# Patient Record
Sex: FEMALE | Employment: OTHER | URBAN - METROPOLITAN AREA
[De-identification: names, ages, dates, MRNs, and addresses within clinical notes are randomized per-mention and may not be internally consistent; named-entity substitution may affect disease eponyms.]

---

## 2023-10-15 ENCOUNTER — APPOINTMENT (OUTPATIENT)
Dept: RADIOLOGY | Facility: HOSPITAL | Age: 78
DRG: 492 | End: 2023-10-15
Payer: MEDICARE

## 2023-10-15 ENCOUNTER — HOSPITAL ENCOUNTER (INPATIENT)
Facility: HOSPITAL | Age: 78
LOS: 5 days | Discharge: NURSING, REHAB OR LONG-TERM CARE | DRG: 492 | End: 2023-10-20
Attending: INTERNAL MEDICINE | Admitting: NURSE PRACTITIONER
Payer: MEDICARE

## 2023-10-15 DIAGNOSIS — S82.851A TRIMALLEOLAR FRACTURE OF ANKLE, CLOSED, RIGHT, INITIAL ENCOUNTER: Primary | ICD-10-CM

## 2023-10-15 DIAGNOSIS — M46.46 DISCITIS OF LUMBAR REGION: ICD-10-CM

## 2023-10-15 DIAGNOSIS — N18.6 ESRD (END STAGE RENAL DISEASE) ON DIALYSIS (MULTI): ICD-10-CM

## 2023-10-15 DIAGNOSIS — Z99.2 ESRD (END STAGE RENAL DISEASE) ON DIALYSIS (MULTI): ICD-10-CM

## 2023-10-15 DIAGNOSIS — M46.20 OSTEOMYELITIS OF SPINE (MULTI): ICD-10-CM

## 2023-10-15 DIAGNOSIS — I48.11 LONGSTANDING PERSISTENT ATRIAL FIBRILLATION (MULTI): ICD-10-CM

## 2023-10-15 DIAGNOSIS — E11.21 TYPE 2 DIABETES MELLITUS WITH NEPHROPATHY (MULTI): ICD-10-CM

## 2023-10-15 PROBLEM — R53.83 FATIGUE: Status: RESOLVED | Noted: 2021-09-21 | Resolved: 2023-10-15

## 2023-10-15 PROBLEM — I27.20 PULMONARY HTN (MULTI): Status: ACTIVE | Noted: 2023-05-15

## 2023-10-15 PROBLEM — I07.1 TRICUSPID VALVE REGURGITATION: Status: ACTIVE | Noted: 2023-05-15

## 2023-10-15 PROBLEM — R68.89 IMPAIRED FUNCTION OF UPPER EXTREMITY: Status: RESOLVED | Noted: 2020-02-07 | Resolved: 2023-10-15

## 2023-10-15 PROBLEM — I25.10 CAD (CORONARY ARTERY DISEASE): Status: ACTIVE | Noted: 2021-09-22

## 2023-10-15 PROBLEM — R17 JAUNDICE: Status: RESOLVED | Noted: 2018-09-19 | Resolved: 2023-10-15

## 2023-10-15 PROBLEM — I48.91 ATRIAL FIBRILLATION (MULTI): Status: ACTIVE | Noted: 2021-09-21

## 2023-10-15 PROBLEM — R47.1 DYSARTHRIA: Status: RESOLVED | Noted: 2017-01-28 | Resolved: 2023-10-15

## 2023-10-15 PROBLEM — R10.13 EPIGASTRIC PAIN: Status: RESOLVED | Noted: 2018-09-19 | Resolved: 2023-10-15

## 2023-10-15 PROBLEM — S52.502D CLOSED FRACTURE OF DISTAL END OF LEFT RADIUS WITH ROUTINE HEALING: Status: RESOLVED | Noted: 2020-02-07 | Resolved: 2023-10-15

## 2023-10-15 PROBLEM — M81.0 OSTEOPOROSIS: Status: ACTIVE | Noted: 2017-10-11

## 2023-10-15 PROBLEM — R01.1 HEART MURMUR PREVIOUSLY UNDIAGNOSED: Status: ACTIVE | Noted: 2023-08-29

## 2023-10-15 PROBLEM — K68.12 PSOAS ABSCESS, RIGHT (MULTI): Status: ACTIVE | Noted: 2023-08-29

## 2023-10-15 PROBLEM — D69.6 PLATELETS DECREASED (CMS-HCC): Status: RESOLVED | Noted: 2023-09-22 | Resolved: 2023-10-15

## 2023-10-15 LAB
ABO GROUP (TYPE) IN BLOOD: NORMAL
ALBUMIN SERPL BCP-MCNC: 3.6 G/DL (ref 3.4–5)
ALP SERPL-CCNC: 115 U/L (ref 33–136)
ALT SERPL W P-5'-P-CCNC: 19 U/L (ref 7–45)
ANION GAP BLDV CALCULATED.4IONS-SCNC: 0 MMOL/L (ref 10–25)
ANION GAP SERPL CALC-SCNC: 18 MMOL/L (ref 10–20)
ANION GAP SERPL CALC-SCNC: 22 MMOL/L (ref 10–20)
ANTIBODY SCREEN: NORMAL
APTT PPP: 44 SECONDS (ref 27–38)
AST SERPL W P-5'-P-CCNC: 66 U/L (ref 9–39)
BASE EXCESS BLDV CALC-SCNC: 12.2 MMOL/L (ref -2–3)
BASOPHILS # BLD AUTO: 0.02 X10*3/UL (ref 0–0.1)
BASOPHILS NFR BLD AUTO: 0.5 %
BILIRUB SERPL-MCNC: 0.8 MG/DL (ref 0–1.2)
BODY TEMPERATURE: 37 DEGREES CELSIUS
BUN SERPL-MCNC: 20 MG/DL (ref 6–23)
BUN SERPL-MCNC: 20 MG/DL (ref 6–23)
CA-I BLDV-SCNC: 1.06 MMOL/L (ref 1.1–1.33)
CALCIUM SERPL-MCNC: 8.8 MG/DL (ref 8.6–10.6)
CALCIUM SERPL-MCNC: 9 MG/DL (ref 8.6–10.6)
CHLORIDE BLDV-SCNC: 100 MMOL/L (ref 98–107)
CHLORIDE SERPL-SCNC: 96 MMOL/L (ref 98–107)
CHLORIDE SERPL-SCNC: 97 MMOL/L (ref 98–107)
CO2 SERPL-SCNC: 23 MMOL/L (ref 21–32)
CO2 SERPL-SCNC: 27 MMOL/L (ref 21–32)
CREAT SERPL-MCNC: 4.17 MG/DL (ref 0.5–1.05)
CREAT SERPL-MCNC: 4.24 MG/DL (ref 0.5–1.05)
EOSINOPHIL # BLD AUTO: 0.06 X10*3/UL (ref 0–0.4)
EOSINOPHIL NFR BLD AUTO: 1.6 %
ERYTHROCYTE [DISTWIDTH] IN BLOOD BY AUTOMATED COUNT: 14 % (ref 11.5–14.5)
ERYTHROCYTE [DISTWIDTH] IN BLOOD BY AUTOMATED COUNT: 14.5 % (ref 11.5–14.5)
GFR SERPL CREATININE-BSD FRML MDRD: 10 ML/MIN/1.73M*2
GFR SERPL CREATININE-BSD FRML MDRD: 10 ML/MIN/1.73M*2
GLUCOSE BLD MANUAL STRIP-MCNC: 143 MG/DL (ref 74–99)
GLUCOSE BLDV-MCNC: 122 MG/DL (ref 74–99)
GLUCOSE SERPL-MCNC: 112 MG/DL (ref 74–99)
GLUCOSE SERPL-MCNC: 112 MG/DL (ref 74–99)
HCO3 BLDV-SCNC: 37.2 MMOL/L (ref 22–26)
HCT VFR BLD AUTO: 23.3 % (ref 36–46)
HCT VFR BLD AUTO: 24.9 % (ref 36–46)
HCT VFR BLD EST: 28 % (ref 36–46)
HGB BLD-MCNC: 7.9 G/DL (ref 12–16)
HGB BLD-MCNC: 8.6 G/DL (ref 12–16)
HGB BLDV-MCNC: 9.3 G/DL (ref 12–16)
IMM GRANULOCYTES # BLD AUTO: 0.02 X10*3/UL (ref 0–0.5)
IMM GRANULOCYTES NFR BLD AUTO: 0.5 % (ref 0–0.9)
INR PPP: 1.3 (ref 0.9–1.1)
LACTATE BLDV-SCNC: 2.8 MMOL/L (ref 0.4–2)
LYMPHOCYTES # BLD AUTO: 0.54 X10*3/UL (ref 0.8–3)
LYMPHOCYTES NFR BLD AUTO: 14.6 %
MCH RBC QN AUTO: 34.8 PG (ref 26–34)
MCH RBC QN AUTO: 35.2 PG (ref 26–34)
MCHC RBC AUTO-ENTMCNC: 33.9 G/DL (ref 32–36)
MCHC RBC AUTO-ENTMCNC: 34.5 G/DL (ref 32–36)
MCV RBC AUTO: 102 FL (ref 80–100)
MCV RBC AUTO: 103 FL (ref 80–100)
MONOCYTES # BLD AUTO: 0.31 X10*3/UL (ref 0.05–0.8)
MONOCYTES NFR BLD AUTO: 8.4 %
NEUTROPHILS # BLD AUTO: 2.76 X10*3/UL (ref 1.6–5.5)
NEUTROPHILS NFR BLD AUTO: 74.4 %
NRBC BLD-RTO: 0 /100 WBCS (ref 0–0)
NRBC BLD-RTO: 0 /100 WBCS (ref 0–0)
OXYHGB MFR BLDV: 62.1 % (ref 45–75)
PCO2 BLDV: 50 MM HG (ref 41–51)
PH BLDV: 7.48 PH (ref 7.33–7.43)
PLATELET # BLD AUTO: 81 X10*3/UL (ref 150–450)
PLATELET # BLD AUTO: 86 X10*3/UL (ref 150–450)
PMV BLD AUTO: 10.4 FL (ref 7.5–11.5)
PMV BLD AUTO: 10.5 FL (ref 7.5–11.5)
PO2 BLDV: 46 MM HG (ref 35–45)
POTASSIUM BLDV-SCNC: 5.2 MMOL/L (ref 3.5–5.3)
POTASSIUM SERPL-SCNC: 4.7 MMOL/L (ref 3.5–5.3)
POTASSIUM SERPL-SCNC: 4.7 MMOL/L (ref 3.5–5.3)
PROT SERPL-MCNC: 6.2 G/DL (ref 6.4–8.2)
PROTHROMBIN TIME: 14.2 SECONDS (ref 9.8–12.8)
RBC # BLD AUTO: 2.27 X10*6/UL (ref 4–5.2)
RBC # BLD AUTO: 2.44 X10*6/UL (ref 4–5.2)
RH FACTOR (ANTIGEN D): NORMAL
SAO2 % BLDV: 65 % (ref 45–75)
SARS-COV-2 RNA RESP QL NAA+PROBE: NOT DETECTED
SODIUM BLDV-SCNC: 132 MMOL/L (ref 136–145)
SODIUM SERPL-SCNC: 136 MMOL/L (ref 136–145)
SODIUM SERPL-SCNC: 137 MMOL/L (ref 136–145)
WBC # BLD AUTO: 3.7 X10*3/UL (ref 4.4–11.3)
WBC # BLD AUTO: 3.9 X10*3/UL (ref 4.4–11.3)

## 2023-10-15 PROCEDURE — 36415 COLL VENOUS BLD VENIPUNCTURE: CPT | Mod: CMCLAB | Performed by: EMERGENCY MEDICINE

## 2023-10-15 PROCEDURE — 36415 COLL VENOUS BLD VENIPUNCTURE: CPT | Mod: CMCLAB

## 2023-10-15 PROCEDURE — 73630 X-RAY EXAM OF FOOT: CPT | Mod: RT,FY

## 2023-10-15 PROCEDURE — 82947 ASSAY GLUCOSE BLOOD QUANT: CPT

## 2023-10-15 PROCEDURE — 0SSFXZZ REPOSITION RIGHT ANKLE JOINT, EXTERNAL APPROACH: ICD-10-PCS

## 2023-10-15 PROCEDURE — 85025 COMPLETE CBC W/AUTO DIFF WBC: CPT | Performed by: EMERGENCY MEDICINE

## 2023-10-15 PROCEDURE — 2500000004 HC RX 250 GENERAL PHARMACY W/ HCPCS (ALT 636 FOR OP/ED): Performed by: EMERGENCY MEDICINE

## 2023-10-15 PROCEDURE — 82805 BLOOD GASES W/O2 SATURATION: CPT | Mod: CMCLAB

## 2023-10-15 PROCEDURE — 99222 1ST HOSP IP/OBS MODERATE 55: CPT

## 2023-10-15 PROCEDURE — 87635 SARS-COV-2 COVID-19 AMP PRB: CPT | Mod: CMCLAB | Performed by: NURSE PRACTITIONER

## 2023-10-15 PROCEDURE — 99223 1ST HOSP IP/OBS HIGH 75: CPT | Performed by: NURSE PRACTITIONER

## 2023-10-15 PROCEDURE — 73564 X-RAY EXAM KNEE 4 OR MORE: CPT | Mod: RT,FY

## 2023-10-15 PROCEDURE — 85730 THROMBOPLASTIN TIME PARTIAL: CPT | Performed by: EMERGENCY MEDICINE

## 2023-10-15 PROCEDURE — 80053 COMPREHEN METABOLIC PANEL: CPT | Performed by: EMERGENCY MEDICINE

## 2023-10-15 PROCEDURE — 73700 CT LOWER EXTREMITY W/O DYE: CPT | Mod: RT,FR,MG

## 2023-10-15 PROCEDURE — 73610 X-RAY EXAM OF ANKLE: CPT | Mod: RT,FY

## 2023-10-15 PROCEDURE — 73502 X-RAY EXAM HIP UNI 2-3 VIEWS: CPT | Mod: RT,FR

## 2023-10-15 PROCEDURE — 73630 X-RAY EXAM OF FOOT: CPT | Mod: RIGHT SIDE | Performed by: RADIOLOGY

## 2023-10-15 PROCEDURE — 73590 X-RAY EXAM OF LOWER LEG: CPT | Mod: RIGHT SIDE | Performed by: RADIOLOGY

## 2023-10-15 PROCEDURE — 85027 COMPLETE CBC AUTOMATED: CPT | Mod: CMCLAB

## 2023-10-15 PROCEDURE — 73610 X-RAY EXAM OF ANKLE: CPT | Mod: RT,FY,FR

## 2023-10-15 PROCEDURE — 1100000001 HC PRIVATE ROOM DAILY

## 2023-10-15 PROCEDURE — 73610 X-RAY EXAM OF ANKLE: CPT | Mod: RIGHT SIDE | Performed by: RADIOLOGY

## 2023-10-15 PROCEDURE — 73700 CT LOWER EXTREMITY W/O DYE: CPT | Mod: RIGHT SIDE | Performed by: RADIOLOGY

## 2023-10-15 PROCEDURE — 86850 RBC ANTIBODY SCREEN: CPT

## 2023-10-15 PROCEDURE — 71045 X-RAY EXAM CHEST 1 VIEW: CPT | Mod: FY

## 2023-10-15 PROCEDURE — 96374 THER/PROPH/DIAG INJ IV PUSH: CPT

## 2023-10-15 PROCEDURE — 96375 TX/PRO/DX INJ NEW DRUG ADDON: CPT

## 2023-10-15 PROCEDURE — 85610 PROTHROMBIN TIME: CPT | Mod: CMCLAB | Performed by: EMERGENCY MEDICINE

## 2023-10-15 PROCEDURE — 99285 EMERGENCY DEPT VISIT HI MDM: CPT | Mod: 25 | Performed by: EMERGENCY MEDICINE

## 2023-10-15 PROCEDURE — 73552 X-RAY EXAM OF FEMUR 2/>: CPT | Mod: RT,FY,FR

## 2023-10-15 PROCEDURE — 71045 X-RAY EXAM CHEST 1 VIEW: CPT | Mod: FOREIGN READ | Performed by: RADIOLOGY

## 2023-10-15 PROCEDURE — 76000 FLUOROSCOPY <1 HR PHYS/QHP: CPT

## 2023-10-15 PROCEDURE — 73564 X-RAY EXAM KNEE 4 OR MORE: CPT | Mod: RIGHT SIDE | Performed by: STUDENT IN AN ORGANIZED HEALTH CARE EDUCATION/TRAINING PROGRAM

## 2023-10-15 PROCEDURE — 80048 BASIC METABOLIC PNL TOTAL CA: CPT | Mod: CMCLAB | Performed by: INTERNAL MEDICINE

## 2023-10-15 PROCEDURE — 99285 EMERGENCY DEPT VISIT HI MDM: CPT | Performed by: EMERGENCY MEDICINE

## 2023-10-15 PROCEDURE — 73502 X-RAY EXAM HIP UNI 2-3 VIEWS: CPT | Mod: RIGHT SIDE | Performed by: RADIOLOGY

## 2023-10-15 PROCEDURE — 86920 COMPATIBILITY TEST SPIN: CPT

## 2023-10-15 PROCEDURE — 73590 X-RAY EXAM OF LOWER LEG: CPT | Mod: RT,FY,FR

## 2023-10-15 PROCEDURE — 73552 X-RAY EXAM OF FEMUR 2/>: CPT | Mod: RIGHT SIDE | Performed by: RADIOLOGY

## 2023-10-15 PROCEDURE — 82947 ASSAY GLUCOSE BLOOD QUANT: CPT | Mod: CMCLAB

## 2023-10-15 PROCEDURE — 2500000001 HC RX 250 WO HCPCS SELF ADMINISTERED DRUGS (ALT 637 FOR MEDICARE OP): Performed by: NURSE PRACTITIONER

## 2023-10-15 RX ORDER — DEXTROSE 50 % IN WATER (D50W) INTRAVENOUS SYRINGE
25
Status: DISCONTINUED | OUTPATIENT
Start: 2023-10-15 | End: 2023-10-20 | Stop reason: HOSPADM

## 2023-10-15 RX ORDER — DICLOFENAC EPOLAMINE 0.01 G/1
1 SYSTEM TOPICAL EVERY 12 HOURS PRN
Status: ON HOLD | COMMUNITY
Start: 2023-09-05 | End: 2023-10-17 | Stop reason: ALTCHOICE

## 2023-10-15 RX ORDER — DEXTROSE MONOHYDRATE 100 MG/ML
0.3 INJECTION, SOLUTION INTRAVENOUS ONCE AS NEEDED
Status: DISCONTINUED | OUTPATIENT
Start: 2023-10-15 | End: 2023-10-20 | Stop reason: HOSPADM

## 2023-10-15 RX ORDER — OMEPRAZOLE 40 MG/1
40 CAPSULE, DELAYED RELEASE ORAL
Status: ON HOLD | COMMUNITY
Start: 2023-04-18 | End: 2023-10-17 | Stop reason: ALTCHOICE

## 2023-10-15 RX ORDER — DICLOFENAC SODIUM 10 MG/G
4 GEL TOPICAL 2 TIMES DAILY
Status: DISCONTINUED | OUTPATIENT
Start: 2023-10-15 | End: 2023-10-20 | Stop reason: HOSPADM

## 2023-10-15 RX ORDER — MIDODRINE HYDROCHLORIDE 5 MG/1
5 TABLET ORAL 3 TIMES WEEKLY
COMMUNITY
Start: 2023-10-12 | End: 2024-01-10

## 2023-10-15 RX ORDER — INSULIN ASPART 100 [IU]/ML
8 INJECTION, SUSPENSION SUBCUTANEOUS EVERY EVENING
Status: ON HOLD | COMMUNITY
End: 2023-10-20 | Stop reason: SDUPTHER

## 2023-10-15 RX ORDER — NYSTATIN 100000 [USP'U]/G
1 POWDER TOPICAL 3 TIMES DAILY
Status: DISCONTINUED | OUTPATIENT
Start: 2023-10-15 | End: 2023-10-20 | Stop reason: HOSPADM

## 2023-10-15 RX ORDER — ACETAMINOPHEN 325 MG/1
975 TABLET ORAL EVERY 8 HOURS
Status: DISCONTINUED | OUTPATIENT
Start: 2023-10-15 | End: 2023-10-20 | Stop reason: HOSPADM

## 2023-10-15 RX ORDER — LIDOCAINE 50 MG/G
1 PATCH TOPICAL
COMMUNITY
Start: 2023-09-15

## 2023-10-15 RX ORDER — RENO CAPS 100; 1.5; 1.7; 20; 10; 1; 150; 5; 6 MG/1; MG/1; MG/1; MG/1; MG/1; MG/1; UG/1; MG/1; UG/1
1 CAPSULE ORAL
COMMUNITY
Start: 2023-02-10 | End: 2023-10-20 | Stop reason: HOSPADM

## 2023-10-15 RX ORDER — LIDOCAINE HYDROCHLORIDE AND EPINEPHRINE 10; 10 MG/ML; UG/ML
20 INJECTION, SOLUTION INFILTRATION; PERINEURAL ONCE
Status: DISCONTINUED | OUTPATIENT
Start: 2023-10-15 | End: 2023-10-20 | Stop reason: HOSPADM

## 2023-10-15 RX ORDER — DICLOFENAC EPOLAMINE 0.01 G/1
1 SYSTEM TOPICAL 2 TIMES DAILY
Status: DISCONTINUED | OUTPATIENT
Start: 2023-10-15 | End: 2023-10-15

## 2023-10-15 RX ORDER — LIDOCAINE 50 MG/G
1 PATCH TOPICAL
Status: DISCONTINUED | OUTPATIENT
Start: 2023-10-15 | End: 2023-10-15

## 2023-10-15 RX ORDER — HYDROMORPHONE HYDROCHLORIDE 1 MG/ML
0.5 INJECTION, SOLUTION INTRAMUSCULAR; INTRAVENOUS; SUBCUTANEOUS EVERY 6 HOURS PRN
Status: DISCONTINUED | OUTPATIENT
Start: 2023-10-15 | End: 2023-10-20 | Stop reason: HOSPADM

## 2023-10-15 RX ORDER — ONDANSETRON HYDROCHLORIDE 2 MG/ML
4 INJECTION, SOLUTION INTRAVENOUS ONCE
Status: COMPLETED | OUTPATIENT
Start: 2023-10-15 | End: 2023-10-15

## 2023-10-15 RX ORDER — ONDANSETRON HYDROCHLORIDE 2 MG/ML
4 INJECTION, SOLUTION INTRAVENOUS EVERY 6 HOURS PRN
Status: DISCONTINUED | OUTPATIENT
Start: 2023-10-15 | End: 2023-10-20 | Stop reason: HOSPADM

## 2023-10-15 RX ORDER — GABAPENTIN 100 MG/1
100 CAPSULE ORAL EVERY MORNING
Status: DISCONTINUED | OUTPATIENT
Start: 2023-10-16 | End: 2023-10-20 | Stop reason: HOSPADM

## 2023-10-15 RX ORDER — GABAPENTIN 100 MG/1
100 CAPSULE ORAL EVERY MORNING
COMMUNITY
End: 2023-10-20 | Stop reason: HOSPADM

## 2023-10-15 RX ORDER — INSULIN ASPART 100 [IU]/ML
10 INJECTION, SUSPENSION SUBCUTANEOUS EVERY MORNING
Status: ON HOLD | COMMUNITY
End: 2023-10-20 | Stop reason: SDUPTHER

## 2023-10-15 RX ORDER — HYDROMORPHONE HYDROCHLORIDE 1 MG/ML
0.5 INJECTION, SOLUTION INTRAMUSCULAR; INTRAVENOUS; SUBCUTANEOUS EVERY 6 HOURS PRN
Status: DISCONTINUED | OUTPATIENT
Start: 2023-10-15 | End: 2023-10-15

## 2023-10-15 RX ORDER — GABAPENTIN 100 MG/1
200 CAPSULE ORAL NIGHTLY
COMMUNITY
Start: 2023-09-15 | End: 2024-01-26

## 2023-10-15 RX ORDER — INSULIN LISPRO 100 [IU]/ML
0-5 INJECTION, SOLUTION INTRAVENOUS; SUBCUTANEOUS
Status: DISCONTINUED | OUTPATIENT
Start: 2023-10-15 | End: 2023-10-20 | Stop reason: HOSPADM

## 2023-10-15 RX ORDER — MECLIZINE HYDROCHLORIDE 25 MG/1
12.5 TABLET ORAL 3 TIMES DAILY PRN
COMMUNITY
Start: 2016-11-30

## 2023-10-15 RX ORDER — AMOXICILLIN 250 MG
2 CAPSULE ORAL 2 TIMES DAILY
Status: DISCONTINUED | OUTPATIENT
Start: 2023-10-15 | End: 2023-10-20 | Stop reason: HOSPADM

## 2023-10-15 RX ORDER — MIDODRINE HYDROCHLORIDE 5 MG/1
5 TABLET ORAL 3 TIMES WEEKLY
Status: DISCONTINUED | OUTPATIENT
Start: 2023-10-16 | End: 2023-10-15

## 2023-10-15 RX ORDER — HYDROMORPHONE HYDROCHLORIDE 1 MG/ML
0.4 INJECTION, SOLUTION INTRAMUSCULAR; INTRAVENOUS; SUBCUTANEOUS ONCE
Status: COMPLETED | OUTPATIENT
Start: 2023-10-15 | End: 2023-10-15

## 2023-10-15 RX ORDER — LIDOCAINE 560 MG/1
1 PATCH PERCUTANEOUS; TOPICAL; TRANSDERMAL DAILY
Status: DISCONTINUED | OUTPATIENT
Start: 2023-10-16 | End: 2023-10-20 | Stop reason: HOSPADM

## 2023-10-15 RX ORDER — OXYCODONE HYDROCHLORIDE 5 MG/1
5 TABLET ORAL EVERY 4 HOURS PRN
Status: DISCONTINUED | OUTPATIENT
Start: 2023-10-15 | End: 2023-10-20 | Stop reason: HOSPADM

## 2023-10-15 RX ORDER — MIDODRINE HYDROCHLORIDE 5 MG/1
5 TABLET ORAL
Status: DISCONTINUED | OUTPATIENT
Start: 2023-10-17 | End: 2023-10-20 | Stop reason: HOSPADM

## 2023-10-15 RX ORDER — METHOCARBAMOL 500 MG/1
500 TABLET, FILM COATED ORAL EVERY 8 HOURS PRN
COMMUNITY
Start: 2023-09-05 | End: 2023-10-20 | Stop reason: HOSPADM

## 2023-10-15 RX ORDER — GABAPENTIN 100 MG/1
200 CAPSULE ORAL NIGHTLY
Status: DISCONTINUED | OUTPATIENT
Start: 2023-10-15 | End: 2023-10-20 | Stop reason: HOSPADM

## 2023-10-15 RX ADMIN — ONDANSETRON 4 MG: 2 INJECTION INTRAMUSCULAR; INTRAVENOUS at 09:37

## 2023-10-15 RX ADMIN — HYDROMORPHONE HYDROCHLORIDE 0.4 MG: 1 INJECTION, SOLUTION INTRAMUSCULAR; INTRAVENOUS; SUBCUTANEOUS at 09:38

## 2023-10-15 RX ADMIN — OXYCODONE HYDROCHLORIDE 5 MG: 5 TABLET ORAL at 18:45

## 2023-10-15 RX ADMIN — ACETAMINOPHEN 975 MG: 325 TABLET ORAL at 18:45

## 2023-10-15 RX ADMIN — GABAPENTIN 200 MG: 100 CAPSULE ORAL at 22:46

## 2023-10-15 RX ADMIN — OXYCODONE HYDROCHLORIDE 5 MG: 5 TABLET ORAL at 22:46

## 2023-10-15 RX ADMIN — SENNOSIDES AND DOCUSATE SODIUM 2 TABLET: 50; 8.6 TABLET ORAL at 22:46

## 2023-10-15 RX ADMIN — NYSTATIN 1 APPLICATION: 100000 POWDER TOPICAL at 21:00

## 2023-10-15 RX ADMIN — ASCORBIC ACID, THIAMINE MONONITRATE,RIBOFLAVIN, NIACINAMIDE, PYRIDOXINE HYDROCHLORIDE, FOLIC ACID, CYANOCOBALAMIN, BIOTIN, CALCIUM PANTOTHENATE, 1 CAPSULE: 100; 1.5; 1.7; 20; 10; 1; 6000; 150000; 5 CAPSULE, LIQUID FILLED ORAL at 18:45

## 2023-10-15 SDOH — SOCIAL STABILITY: SOCIAL INSECURITY: HAS ANYONE EVER THREATENED TO HURT YOUR FAMILY OR YOUR PETS?: NO

## 2023-10-15 SDOH — SOCIAL STABILITY: SOCIAL INSECURITY: ABUSE: ADULT

## 2023-10-15 SDOH — SOCIAL STABILITY: SOCIAL INSECURITY: DO YOU FEEL ANYONE HAS EXPLOITED OR TAKEN ADVANTAGE OF YOU FINANCIALLY OR OF YOUR PERSONAL PROPERTY?: NO

## 2023-10-15 SDOH — SOCIAL STABILITY: SOCIAL INSECURITY: ARE YOU OR HAVE YOU BEEN THREATENED OR ABUSED PHYSICALLY, EMOTIONALLY, OR SEXUALLY BY ANYONE?: NO

## 2023-10-15 SDOH — SOCIAL STABILITY: SOCIAL INSECURITY: HAVE YOU HAD THOUGHTS OF HARMING ANYONE ELSE?: NO

## 2023-10-15 SDOH — SOCIAL STABILITY: SOCIAL INSECURITY: DOES ANYONE TRY TO KEEP YOU FROM HAVING/CONTACTING OTHER FRIENDS OR DOING THINGS OUTSIDE YOUR HOME?: NO

## 2023-10-15 SDOH — SOCIAL STABILITY: SOCIAL INSECURITY: DO YOU FEEL UNSAFE GOING BACK TO THE PLACE WHERE YOU ARE LIVING?: NO

## 2023-10-15 SDOH — SOCIAL STABILITY: SOCIAL INSECURITY: ARE THERE ANY APPARENT SIGNS OF INJURIES/BEHAVIORS THAT COULD BE RELATED TO ABUSE/NEGLECT?: NO

## 2023-10-15 SDOH — SOCIAL STABILITY: SOCIAL INSECURITY: WERE YOU ABLE TO COMPLETE ALL THE BEHAVIORAL HEALTH SCREENINGS?: YES

## 2023-10-15 ASSESSMENT — COGNITIVE AND FUNCTIONAL STATUS - GENERAL
DAILY ACTIVITIY SCORE: 14
DRESSING REGULAR LOWER BODY CLOTHING: A LOT
WALKING IN HOSPITAL ROOM: TOTAL
DRESSING REGULAR UPPER BODY CLOTHING: A LOT
TURNING FROM BACK TO SIDE WHILE IN FLAT BAD: A LOT
TOILETING: A LOT
CLIMB 3 TO 5 STEPS WITH RAILING: TOTAL
MOBILITY SCORE: 8
EATING MEALS: A LITTLE
STANDING UP FROM CHAIR USING ARMS: TOTAL
PERSONAL GROOMING: A LOT
TURNING FROM BACK TO SIDE WHILE IN FLAT BAD: A LITTLE
DRESSING REGULAR LOWER BODY CLOTHING: A LOT
STANDING UP FROM CHAIR USING ARMS: TOTAL
MOVING FROM LYING ON BACK TO SITTING ON SIDE OF FLAT BED WITH BEDRAILS: A LITTLE
WALKING IN HOSPITAL ROOM: TOTAL
TOILETING: A LOT
DRESSING REGULAR UPPER BODY CLOTHING: A LOT
EATING MEALS: A LITTLE
PERSONAL GROOMING: A LITTLE
CLIMB 3 TO 5 STEPS WITH RAILING: TOTAL
MOVING TO AND FROM BED TO CHAIR: TOTAL
MOVING FROM LYING ON BACK TO SITTING ON SIDE OF FLAT BED WITH BEDRAILS: A LOT
HELP NEEDED FOR BATHING: A LOT
HELP NEEDED FOR BATHING: A LOT
MOVING TO AND FROM BED TO CHAIR: TOTAL
MOBILITY SCORE: 10
DAILY ACTIVITIY SCORE: 13

## 2023-10-15 ASSESSMENT — ENCOUNTER SYMPTOMS
CARDIOVASCULAR NEGATIVE: 1
BACK PAIN: 1
NUMBNESS: 1
NAUSEA: 0
EYES NEGATIVE: 1
ARTHRALGIAS: 1
WHEEZING: 0
CONSTITUTIONAL NEGATIVE: 1
CHILLS: 0
NECK PAIN: 0
GASTROINTESTINAL NEGATIVE: 1
JOINT SWELLING: 1
DIZZINESS: 0
RESPIRATORY NEGATIVE: 1
VOMITING: 0
SHORTNESS OF BREATH: 0
LIGHT-HEADEDNESS: 0
WEAKNESS: 1
FEVER: 0
NECK STIFFNESS: 0

## 2023-10-15 ASSESSMENT — PAIN - FUNCTIONAL ASSESSMENT
PAIN_FUNCTIONAL_ASSESSMENT: 0-10

## 2023-10-15 ASSESSMENT — COLUMBIA-SUICIDE SEVERITY RATING SCALE - C-SSRS
6. HAVE YOU EVER DONE ANYTHING, STARTED TO DO ANYTHING, OR PREPARED TO DO ANYTHING TO END YOUR LIFE?: NO
1. IN THE PAST MONTH, HAVE YOU WISHED YOU WERE DEAD OR WISHED YOU COULD GO TO SLEEP AND NOT WAKE UP?: NO
2. HAVE YOU ACTUALLY HAD ANY THOUGHTS OF KILLING YOURSELF?: NO

## 2023-10-15 ASSESSMENT — PATIENT HEALTH QUESTIONNAIRE - PHQ9
1. LITTLE INTEREST OR PLEASURE IN DOING THINGS: NOT AT ALL
2. FEELING DOWN, DEPRESSED OR HOPELESS: NOT AT ALL
SUM OF ALL RESPONSES TO PHQ9 QUESTIONS 1 & 2: 0

## 2023-10-15 ASSESSMENT — ACTIVITIES OF DAILY LIVING (ADL)
ADEQUATE_TO_COMPLETE_ADL: NO
PATIENT'S MEMORY ADEQUATE TO SAFELY COMPLETE DAILY ACTIVITIES?: YES
HEARING - RIGHT EAR: DIFFICULTY WITH NOISE
WALKS IN HOME: NEEDS ASSISTANCE
DRESSING YOURSELF: NEEDS ASSISTANCE
GROOMING: NEEDS ASSISTANCE
ASSISTIVE_DEVICE: EYEGLASSES
JUDGMENT_ADEQUATE_SAFELY_COMPLETE_DAILY_ACTIVITIES: YES
BATHING: NEEDS ASSISTANCE
LACK_OF_TRANSPORTATION: NO
FEEDING YOURSELF: DEPENDENT
HEARING - LEFT EAR: DIFFICULTY WITH NOISE
TOILETING: NEEDS ASSISTANCE

## 2023-10-15 ASSESSMENT — LIFESTYLE VARIABLES
SKIP TO QUESTIONS 9-10: 1
AUDIT-C TOTAL SCORE: 0
HOW OFTEN DO YOU HAVE 6 OR MORE DRINKS ON ONE OCCASION: NEVER
SUBSTANCE_ABUSE_PAST_12_MONTHS: NO
HOW OFTEN DO YOU HAVE A DRINK CONTAINING ALCOHOL: NEVER
AUDIT-C TOTAL SCORE: 0
HOW MANY STANDARD DRINKS CONTAINING ALCOHOL DO YOU HAVE ON A TYPICAL DAY: PATIENT DOES NOT DRINK
PRESCIPTION_ABUSE_PAST_12_MONTHS: NO

## 2023-10-15 ASSESSMENT — PAIN SCALES - GENERAL
PAINLEVEL_OUTOF10: 8
PAINLEVEL_OUTOF10: 7
PAINLEVEL_OUTOF10: 4
PAINLEVEL_OUTOF10: 6

## 2023-10-15 NOTE — H&P
History Of Present Illness  Ms. Lidia Krishna is a 78-year-old female with PMHx: T2DM, HTN, cirrhosis with George, esophageal varices, A-fib on Eliquis, ESRD, osteomyelitis of the back, anemia of chronic disease, thrombocytopenia.  Presented to the ED today after falling at home states she was trying to get into bed and she was too weak and fell on the ground tried to correct herself and her legs hit the chair denies any head injury or loss of consciousness.  Patient just completed antibiotic therapy for osteomyelitis of her back on her last dialysis day.  Patient states since she was diagnosed with the osteomyelitis she has been feeling weak and having difficulty ambulating and getting around.    While in the ED her hemoglobin was found to be 7.9 she does have a history of chronic anemia.  Her platelets were 81 she also has a history of thrombocytopenia.  Mainly her x-rays were negative but she was found to have a right trimalleolar fracture Ortho saw her down in the emergency room and plan to take her to surgery in the morning.  Patient to be admitted regular admit for right trimalleolar fracture.     Past Medical History  Past Medical History:   Diagnosis Date    A-fib (CMS/HCC)     Diabetes mellitus (CMS/HCC)     Esophageal varices (CMS/HCC)     ESRD (end stage renal disease) (CMS/HCC)     Fatigue 09/21/2021    Hypertension     Impaired function of upper extremity 02/07/2020    Jaundice 09/19/2018    Liver cirrhosis secondary to GEORGE (CMS/HCC)     Lower urinary tract infectious disease 10/06/2014    Metabolic acidosis 04/20/2015    Osteomyelitis (CMS/HCC)     Osteoporosis     Platelets decreased (CMS/HCC) 09/22/2023       Surgical History  Past Surgical History:   Procedure Laterality Date    ABDOMINAL SURGERY      APPENDECTOMY      AV FISTULA PLACEMENT      BREAST SURGERY      CARPAL TUNNEL RELEASE      CHOLECYSTECTOMY      EYE SURGERY      MASTOID SURGERY      WRIST SURGERY          Social History  She reports that  she has quit smoking. Her smoking use included cigarettes. She has a 30.00 pack-year smoking history. She has never used smokeless tobacco. She reports that she does not drink alcohol and does not use drugs.    Family History  Family History   Problem Relation Name Age of Onset    Coronary artery disease Mother      Other (pacemaker) Mother      Stroke Father      Hypertension Father      Glaucoma Father      Alcohol abuse Father      Cirrhosis Sister      CARMEN disease Sister      COPD Brother      Coronary artery disease Brother      Stomach cancer Maternal Grandmother          Allergies  Darbepoetin jose-albumin and Povidone-iodine    Review of Systems   Constitutional: Negative.    HENT: Negative.     Eyes: Negative.    Respiratory: Negative.  Negative for wheezing.    Cardiovascular: Negative.    Gastrointestinal: Negative.    Genitourinary: Negative.    Musculoskeletal:  Positive for arthralgias, back pain, gait problem and joint swelling.   Skin: Negative.    Neurological:  Positive for weakness.        Physical Exam  Constitutional:       Appearance: Normal appearance. She is normal weight.   HENT:      Head: Normocephalic.      Nose: Nose normal.      Mouth/Throat:      Mouth: Mucous membranes are moist.      Pharynx: Oropharynx is clear.   Eyes:      Extraocular Movements: Extraocular movements intact.      Pupils: Pupils are equal, round, and reactive to light.   Cardiovascular:      Rate and Rhythm: Normal rate and regular rhythm.      Pulses: Normal pulses.      Heart sounds: Normal heart sounds.   Pulmonary:      Effort: Pulmonary effort is normal.      Breath sounds: Normal breath sounds.   Abdominal:      General: Abdomen is flat. Bowel sounds are normal.      Palpations: Abdomen is soft.   Musculoskeletal:         General: Swelling, tenderness and signs of injury present.      Cervical back: Normal range of motion.      Right lower leg: Edema present.   Skin:     General: Skin is warm and dry.  "  Neurological:      General: No focal deficit present.      Mental Status: She is alert and oriented to person, place, and time.   Psychiatric:         Mood and Affect: Mood normal.         Behavior: Behavior normal.           Last Recorded Vitals  Blood pressure 124/67, pulse 75, temperature 35.7 °C (96.3 °F), resp. rate 18, height 1.676 m (5' 6\"), weight 79.4 kg (175 lb), SpO2 99 %.  Intake/Output last 3 Shifts:  No intake/output data recorded.    Relevant Results  Lab Results   Component Value Date    WBC 3.9 (L) 10/15/2023    HGB 7.9 (L) 10/15/2023    HCT 23.3 (L) 10/15/2023     (H) 10/15/2023    PLT 81 (L) 10/15/2023      Lab Results   Component Value Date    GLUCOSE 112 (H) 10/15/2023    GLUCOSE 112 (H) 10/15/2023    CALCIUM 9.0 10/15/2023    CALCIUM 8.8 10/15/2023     10/15/2023     10/15/2023    K 4.7 10/15/2023    K 4.7 10/15/2023    CO2 27 10/15/2023    CO2 23 10/15/2023    CL 97 (L) 10/15/2023    CL 96 (L) 10/15/2023    BUN 20 10/15/2023    BUN 20 10/15/2023    CREATININE 4.24 (H) 10/15/2023    CREATININE 4.17 (H) 10/15/2023     CT ankle right wo IV contrast    Result Date: 10/15/2023  STUDY: CT Extremity; Completed Time:  10/15/2023 13:03 PM. INDICATION: Right ankle dislocation post reduction. COMPARISON: XR ankle 10/15/2023. ACCESSION NUMBER(S): [id number], [id number] ORDERING CLINICIAN: KALEIGH HAYS TECHNIQUE:  Thin section axial images were obtained through the right foot and ankle without intravenous contrast.  Orthogonal reconstructed images were obtained and reviewed.  Automated mA/kV exposure control was utilized and patient examination was performed in strict accordance with principles of ALARA. FINDINGS:  Foot: An examination of the right foot demonstrates adequate bony mineralization. There is no definite evidence of an acute fracture or dislocation. The soft tissues demonstrate infiltrative changes within the subcutaneous soft tissues of the lower ankle and hindfoot " which are likely inflammatory in etiology. Ankle: Examination of the ankle demonstrates a trimalleolar fracture. Status post reduction, there is normal maintenance of the tibiotalar joint. This is significantly improved compared to original radiographs. The soft tissues demonstrate infiltrative changes within the surrounding subcutaneous soft tissues of the ankle. These are likely inflammatory in etiology.    1. Status post reduction of a tibiotalar dislocation with significantly improved alignment. 2. Trimalleolar fractures relatively unchanged in appearance when compared to prior exams. 3. Surrounding subcutaneous infiltrative changes and ill-defined fluid which are likely inflammatory in etiology.. Signed by Vadim Alfaro MD    CT foot right wo IV contrast    Result Date: 10/15/2023  STUDY: CT Extremity; Completed Time:  10/15/2023 13:03 PM. INDICATION: Right ankle dislocation post reduction. COMPARISON: XR ankle 10/15/2023. ACCESSION NUMBER(S): [id number], [id number] ORDERING CLINICIAN: KALEIGH HAYS TECHNIQUE:  Thin section axial images were obtained through the right foot and ankle without intravenous contrast.  Orthogonal reconstructed images were obtained and reviewed.  Automated mA/kV exposure control was utilized and patient examination was performed in strict accordance with principles of ALARA. FINDINGS:  Foot: An examination of the right foot demonstrates adequate bony mineralization. There is no definite evidence of an acute fracture or dislocation. The soft tissues demonstrate infiltrative changes within the subcutaneous soft tissues of the lower ankle and hindfoot which are likely inflammatory in etiology. Ankle: Examination of the ankle demonstrates a trimalleolar fracture. Status post reduction, there is normal maintenance of the tibiotalar joint. This is significantly improved compared to original radiographs. The soft tissues demonstrate infiltrative changes within the surrounding subcutaneous  soft tissues of the ankle. These are likely inflammatory in etiology.    1. Status post reduction of a tibiotalar dislocation with significantly improved alignment. 2. Trimalleolar fractures relatively unchanged in appearance when compared to prior exams. 3. Surrounding subcutaneous infiltrative changes and ill-defined fluid which are likely inflammatory in etiology.. Signed by Vadim Alfaro MD    XR ankle right 3+ views    Result Date: 10/15/2023  STUDY: Ankle Radiographs;  10/15/2023 at 12:49 PM INDICATION: Post-reduction, right ankle fracture. COMPARISON: 10/15/23 XR Ankle (right) at 09:54. ACCESSION NUMBER(S): [id number] ORDERING CLINICIAN: KALEIGH HAYS TECHNIQUE:  Three view(s) of the right ankle. FINDINGS:  Splint material obscures fine bony detail.. Again noted are fractures of the medial lateral posterior malleoli. No appreciable change in alignment or position.  No soft tissue abnormality is seen.    Trimalleolar fracture as described above. No appreciable change in alignment or position.. Signed by Vadim Alfaro MD    FL less than 1 hour    Result Date: 10/15/2023  These images are not reportable by radiology and will not be interpreted by  Radiologists.    XR femur right 2+ views    Result Date: 10/15/2023  STUDY: Femur Radiographs; 10/15/2023 11:48 AM INDICATION: Right mid femur/hip pain. COMPARISON: None. ACCESSION NUMBER(S): [id number] ORDERING CLINICIAN: KALEIGH HAYS TECHNIQUE:  Four view(s) of the right femur. FINDINGS:  There is no displaced fracture.  The alignment is anatomic. There is narrowing of the right hip joint space. Vascular calcifications are noted within the right lower extremity..    1. No definite acute fracture or dislocation. 2. Osteoarthritis of the right hip.. Signed by Vadim Alfaro MD    XR chest 1 view    Result Date: 10/15/2023  STUDY: Chest Radiograph;  10/15/2023 at 11:37 AM INDICATION: Pre-operative, right ankle. COMPARISON: None Available ACCESSION NUMBER(S):  [id number] ORDERING CLINICIAN: KALEIGH HAYS TECHNIQUE:  Frontal chest was obtained at 11:37 hours. FINDINGS: CARDIOMEDIASTINAL SILHOUETTE: Cardiomediastinal silhouette is normal in size and configuration.  LUNGS: There is an opacity located right lung base which represent pneumonia or atelectasis. There may be a possible small right effusion..  ABDOMEN: No remarkable upper abdominal findings.  BONES: No acute osseous changes.    Opacity located right lung base which may represent atelectasis or pneumonia.. Possible small right effusion. Signed by Vadim Alfaro MD    XR knee right 4+ views    Result Date: 10/15/2023  Interpreted By:  Aidan Mondragon, STUDY: XR KNEE RIGHT 4+ VIEWS; ;  10/15/2023 10:29 am   INDICATION: Signs/Symptoms:pain p fall.   COMPARISON: None.   ACCESSION NUMBER(S): [id number]   ORDERING CLINICIAN: KALEIGH HAYS   FINDINGS: Four views of the right knee.   No acute fracture. No dislocation. Small suprapatellar joint effusion. Vascular calcifications. Mild tricompartmental osteoarthrosis.       No acute fracture or dislocation.   MACRO: None   Signed by: Aidan Mondragon 10/15/2023 11:08 AM Dictation workstation:   [id number]    XR ankle right 3+ views    Result Date: 10/15/2023  STUDY: Right foot, ankle, and tibia and fibula radiographs; 10/15/2023 at 9:54 AM HISTORY:  Right lower leg, foot and ankle pain post fall. TECHNIQUE:  Three views of the right foot, three views of the right ankle, and two views (four images) of the right tibia and fibula. COMPARISON:  None Available. FINDINGS:  There is mild patellofemoral joint space osteoarthritis. There is anterior dislocation of the tibia relative to the talar dome. A fracture of the posterior malleolus of the distal tibia is likely present. There is a new subacute essentially nondisplaced fracture of the medial malleolus. There is no oblique sagittal fracture through the distal fibula, there is minimal lateral displacement of the major distal fracture  fragment. There is a bunion seen at the first metatarsal head. There is narrowing of the interphalangeal joint of the great toe. Atherosclerotic calcifications are present.    1. Trimalleolar fracture dislocation of the ankle. The tibia and fibula are displaced anteriorly relative to the talar dome. The fracture of the medial malleolus appears subacute, the ages of the posterior malleolar and distal fibular fractures appear more acute. No additional acute fractures are identified involving the tibia ankle or foot. 2. Bunion. Mild patellofemoral joint space osteoarthritis. Signed by Uziel Gil MD    XR foot right 3+ views    Result Date: 10/15/2023  STUDY: Right foot, ankle, and tibia and fibula radiographs; 10/15/2023 at 9:54 AM HISTORY:  Right lower leg, foot and ankle pain post fall. TECHNIQUE:  Three views of the right foot, three views of the right ankle, and two views (four images) of the right tibia and fibula. COMPARISON:  None Available. FINDINGS:  There is mild patellofemoral joint space osteoarthritis. There is anterior dislocation of the tibia relative to the talar dome. A fracture of the posterior malleolus of the distal tibia is likely present. There is a new subacute essentially nondisplaced fracture of the medial malleolus. There is no oblique sagittal fracture through the distal fibula, there is minimal lateral displacement of the major distal fracture fragment. There is a bunion seen at the first metatarsal head. There is narrowing of the interphalangeal joint of the great toe. Atherosclerotic calcifications are present.    1. Trimalleolar fracture dislocation of the ankle. The tibia and fibula are displaced anteriorly relative to the talar dome. The fracture of the medial malleolus appears subacute, the ages of the posterior malleolar and distal fibular fractures appear more acute. No additional acute fractures are identified involving the tibia ankle or foot. 2. Bunion. Mild patellofemoral  joint space osteoarthritis. Signed by Uziel Gil MD    XR tibia fibula right 2 views    Result Date: 10/15/2023  STUDY: Right foot, ankle, and tibia and fibula radiographs; 10/15/2023 at 9:54 AM HISTORY:  Right lower leg, foot and ankle pain post fall. TECHNIQUE:  Three views of the right foot, three views of the right ankle, and two views (four images) of the right tibia and fibula. COMPARISON:  None Available. FINDINGS:  There is mild patellofemoral joint space osteoarthritis. There is anterior dislocation of the tibia relative to the talar dome. A fracture of the posterior malleolus of the distal tibia is likely present. There is a new subacute essentially nondisplaced fracture of the medial malleolus. There is no oblique sagittal fracture through the distal fibula, there is minimal lateral displacement of the major distal fracture fragment. There is a bunion seen at the first metatarsal head. There is narrowing of the interphalangeal joint of the great toe. Atherosclerotic calcifications are present.    1. Trimalleolar fracture dislocation of the ankle. The tibia and fibula are displaced anteriorly relative to the talar dome. The fracture of the medial malleolus appears subacute, the ages of the posterior malleolar and distal fibular fractures appear more acute. No additional acute fractures are identified involving the tibia ankle or foot. 2. Bunion. Mild patellofemoral joint space osteoarthritis. Signed by Uziel Gil MD    XR hip right 2 or 3 views    Result Date: 10/15/2023  STUDY: Pelvis and Right Hip Radiographs; 10/15/2023 10:30AM INDICATION: Pain post fall. COMPARISON: None Available. ACCESSION NUMBER(S): [id number] ORDERING CLINICIAN: KALEIGH HAYS TECHNIQUE:  AP view of the pelvis and two view(s) (three images) of the right hip. FINDINGS:  PELVIS: The pelvic ring is intact.  There is no acute fracture. RIGHT HIP: There is no displaced fracture.  The alignment is anatomic.  No soft tissue  abnormality is seen.    No displaced pelvic fracture. If the patient is unable to ambulate or bear weight further cross-sectional imaging should be pursued. Signed by Eric Duke MD    Scheduled medications  [Held by provider] apixaban, 5 mg, oral, BID  B complex-vitamin C-folic acid, 1 capsule, oral, Daily  diclofenac epolamine, 1 patch, transdermal, BID  [START ON 10/16/2023] gabapentin, 100 mg, oral, q AM  gabapentin, 200 mg, oral, Nightly  lidocaine, 1 patch, transdermal, Daily  lidocaine-epinephrine, 20 mL, injection, Once  [START ON 10/16/2023] midodrine, 5 mg, oral, Once per day on Mon Wed Fri  sennosides-docusate sodium, 2 tablet, oral, BID      Continuous medications     PRN medications      Ms. Lidia Krishna is a 78-year-old female with PMHx: T2DM, HTN, cirrhosis with Blake, esophageal varices, A-fib on Eliquis, ESRD, osteomyelitis of the back, anemia of chronic disease, thrombocytopenia.  Presented to the ED today after falling at home states she was trying to get into bed and she was too weak and fell on the ground tried to correct herself and her legs hit the chair denies any head injury or loss of consciousness.  Patient just completed antibiotic therapy for osteomyelitis of her back on her last dialysis day.  Patient states since she was diagnosed with the osteomyelitis she has been feeling weak and having difficulty ambulating and getting around.    While in the ED her hemoglobin was found to be 7.9 she does have a history of chronic anemia.  Her platelets were 81 she also has a history of thrombocytopenia.  Mainly her x-rays were negative but she was found to have a right trimalleolar fracture Ortho saw her down in the emergency room and plan to take her to surgery in the morning.  Patient to be admitted regular admit for right trimalleolar fracture.    Assessment/Plan:    Right trimalleolar fracture  Fall  Weakness  -Ortho consult and appreciate recs  -Zhang  -Preop labs type and screen, PT/INR,  CBC, BMP, COVID, chest x-ray, EKG  -Weightbearing status NWB RLE  -2 units PRBC on hold in the OR  -N.p.o. after midnight  -Will hold apixaban  -Pain Tylenol scheduled, Oxy 5 every 4 as needed for pain 4-10, Dilaudid every 6 as needed breakthrough pain  -PT OT eval and treat  -Zofran as needed for nausea    T2DM  Neuropathy  -Hold NovoLog 70/30  10 units every morning and 8 units pm  -Mild Humalog sliding scale with meals and at bedtime  -Continue home gabapentin 100 mg every morning and 200 nightly    ESRD  -Dialysis at [facility] in [facility] Tuesday/Thursday/Saturday  -Continue Robret-Isidro  -Continue home midodrine 5 mg on dialysis today    Anemia of chronic disease  Thrombocytopenia  -Hemoglobin 7.9  -Platelets 81    HTN  Stable not on any current medications    Osteomyelitis of the spine  -Completed full course of cefepime and Vanc last week  -Voltaren and lido to back        Fluids: monitor and replete as needed  Electrolytes: monitor and replete as needed  Nutrition: Carb control NPO after Midnight  GI prophylaxis: as needed  DVT prophylaxis: SCD  Code Status: Full Code  PCP: Dr Sheth  Pharmacy: [facility]    Disposition:  Admitted for above diagnoses. Plan per above. Anticipate hospitalization >2 midnights.       KIN Link-CNP        Assessment/Plan   Principal Problem:    Trimalleolar fracture of ankle, closed, right, initial encounter      I spent 75 minutes in the professional and overall care of this patient.      KIN Link-CNP

## 2023-10-15 NOTE — CONSULTS
"Reason For Consul  Right Trimalleolar fracture s/p mLF.     History Of Present Illness  Jamee Coronel is a 78 y.o. female presenting with a chief complaint of Right ankle pain s/p mGLF that occurred this AM around 0600 when Pt. was attempting to get back in bed with her wheeled walker when her \"legs gave out\" . Pt. States she fell onto her knee then onto her buttocks, denies Head strike, denies LOC, Unable to get up under own strength and EMS was called. On arrival to ED imaging obtained a demonstrated Right trimalleolar fracture. Ortho consulted and have recommended operative management. Trauma surgery then consulted.      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    Family History  No family history on file.     Allergies  Darbepoetin jose-albumin and Povidone-iodine    Review of Systems  ROS negative unless mentioned above.       Physical Exam  A: Airway intact  B: Breathing spontaneously, breath sounds are bilateral and equal  C: Pulses 2+throughout and equal.    D: Pupils equal and reactive, GCS 15 (E4, V5, M6). Moving all 4 extremities  E: Patient exposed and additional injuries noted; Warm blankets placed on patient     Secondary Survey:  NEURO: A&O x3, GCS 15, CN II-XII intact, No sensory deficits  HEAD: NC/AT, No lacerations or abrasions, no bony step offs, midface stable.  EENT: PERRL, EOMI. Pupils 4-2mm b/l. external ear without laceration. Nasal septum midline, no crepitus or septal hematoma. Oral mucosa and tongue without " "lacerations, teeth in place.   NECK: No cervical spine tenderness or step offs, no lacerations or abrasions, trachea midline. No JVD.  RESPIRATORY/CHEST: No abrasions, contusions, crepitus or tenderness to palpation. Non-labored, equal chest expansion, CTAB, no W/R/R.  CV: RRR, Pulses bilateral: 2+ radial, 2+DP, 2+PT,  2+femoral and 2+ carotid. No TTP of chest  ABDOMEN: soft, nontender, nondistended. No scars, abrasions or lacerations.  PELVIS: Stable to compression. Right hip TTP.   : No blood at urethral meatus  RECTAL: No gross blood noted on exam.  BACK/SPINE: No thoracic midline tenderness, step-offs or deformities. No lumbar midline tenderness, step-offs, or deformities.  No abrasions, hematomas or lacerations noted.  EXTREMITIES: RLE is splinted and elevated on pillows, Intact motor/sensory in distal foot/toes.      Last Recorded Vitals  Blood pressure 124/67, pulse 75, temperature 35.7 °C (96.3 °F), resp. rate 18, height 1.676 m (5' 6\"), weight 79.4 kg (175 lb), SpO2 99 %.    Relevant Results  Right trimalleolar fracture      Assessment/Plan     -No indication for additional imaging warrented  -Given presence of single orthopedic injury in Pt. With numerous co morbidities we would recommend admission under the medicine service with orthopedics on consult.    I spent 35 minutes in the professional and overall care of this patient.    Pt. Seen and plan discussed with my attending Dr. Jozef Lopez, APRN-CNP    "

## 2023-10-15 NOTE — CONSULTS
PLEASE DO NOT SEND TO FLOOR UNTIL I CAN CLEAR POST REDUCTION FILMS ON XR/CT    Reason For Consult  R ankle trimal fx/dx    History Of Present Illness  Name: Jamee Coronel ([id number])  Primary: medicine v trauma  Staff: Ben  Location: ED32 ? floor  Injury: R trimal fx/dx  HPI: 78F (CKD dialysis, IDDM, recently finished IV abx for ongoing lumbar spine osteo/discitis, L DRF s/p ORIF few years prior, C spine surgery 2 decades prior, chronic radicular pain RLE) mGLF sustaining above. Closed. NVI. Negative MSK secondary. Close reduced in ED.   Plan: C&P ORIF v exfix R delroy w Dr. Contreras 10/16    PMH, PSH, FH, SH, Allx reviewed. Per above or EMR.   Full 12 point PROS done. Negative except what is mentioned above.      Objective:  Const: NAD. Cooperative  HEENT: NCAT, PERRL  Cards: RRR, peripherally well perf  Pulm: Breathing comfortably, O2 sat appropriate  Abdomen: soft, non-distended  Neuro: per MSK  Psych: appropriate mood/behavior  Skin: warm, dry, remainder per MSK  MSK RLE:   - Injured extremity gross deformity  - No open injury or bleeding  - Palpable distal pulses, good CR  - NVI SILT per baseline   - Endorsing pain RLE which is consistent w chronic RLE radiculopathy; XR negative  - Soft compartments, compressible, no pain w passive stretch  - Negative MSK secondary    A&P:  Injury: R trimal fx/dx  HPI: 78F (CKD dialysis, IDDM, recently finished IV abx for ongoing lumbar spine osteo/discitis, L DRF s/p ORIF few years prior, C spine surgery 2 decades prior, chronic radicular pain RLE) mGLF sustaining above. Closed. NVI. Negative MSK secondary. Close reduced in ED. RLE SLS.  Plan: C&P ORIF v exfix R trimal w Dr. Contreras 10/16    - NPO at midnight for upcoming surgery with orthopedics.  - Admit to medicine v trauma, clearance pending for OR tomorrow; Appreciate documentation of clearance by primary team  - Please obtain pre-operative labs/studies: T&S, PT/INR, CBC, BMP, COVID, CXR, EKG  - Please place mccullough in  "setting of immobilizing fracture  - Consented and posted to OR schedule per above  - WB status: NWB RLE  - Pre-operative ABx: None indicated   - No indication for transfusion pre-operatively, 2U PRBC on hold for OR  - DVT PPx: SCDs, hold chemoppx in setting of upcoming surgery     D/w attending.     PLEASE DO NOT SEND TO FLOOR UNTIL I CAN CLEAR POST REDUCTION FILMS ON XR/CT     Past Medical History  She has no past medical history on file.    Surgical History  She has no past surgical history on file.     Social History  She has no history on file for tobacco use, alcohol use, and drug use.    Family History  No family history on file.     Allergies  Darbepoetin jose-albumin and Povidone-iodine     Last Recorded Vitals  Blood pressure 124/67, pulse 80, temperature 35.7 °C (96.3 °F), resp. rate 18, height 1.676 m (5' 6\"), weight 79.4 kg (175 lb), SpO2 98 %.    Relevant Results      Scheduled medications  lidocaine-epinephrine, 20 mL, injection, Once      Continuous medications     PRN medications    Results for orders placed or performed during the hospital encounter of 10/15/23 (from the past 24 hour(s))   CBC and Auto Differential   Result Value Ref Range    WBC 3.7 (L) 4.4 - 11.3 x10*3/uL    nRBC 0.0 0.0 - 0.0 /100 WBCs    RBC 2.44 (L) 4.00 - 5.20 x10*6/uL    Hemoglobin 8.6 (L) 12.0 - 16.0 g/dL    Hematocrit 24.9 (L) 36.0 - 46.0 %     (H) 80 - 100 fL    MCH 35.2 (H) 26.0 - 34.0 pg    MCHC 34.5 32.0 - 36.0 g/dL    RDW 14.0 11.5 - 14.5 %    Platelets 86 (L) 150 - 450 x10*3/uL    MPV 10.5 7.5 - 11.5 fL    Neutrophils % 74.4 40.0 - 80.0 %    Immature Granulocytes %, Automated 0.5 0.0 - 0.9 %    Lymphocytes % 14.6 13.0 - 44.0 %    Monocytes % 8.4 2.0 - 10.0 %    Eosinophils % 1.6 0.0 - 6.0 %    Basophils % 0.5 0.0 - 2.0 %    Neutrophils Absolute 2.76 1.60 - 5.50 x10*3/uL    Immature Granulocytes Absolute, Automated 0.02 0.00 - 0.50 x10*3/uL    Lymphocytes Absolute 0.54 (L) 0.80 - 3.00 x10*3/uL    Monocytes " Absolute 0.31 0.05 - 0.80 x10*3/uL    Eosinophils Absolute 0.06 0.00 - 0.40 x10*3/uL    Basophils Absolute 0.02 0.00 - 0.10 x10*3/uL   Comprehensive metabolic panel   Result Value Ref Range    Glucose 112 (H) 74 - 99 mg/dL    Sodium 137 136 - 145 mmol/L    Potassium 4.7 3.5 - 5.3 mmol/L    Chloride 97 (L) 98 - 107 mmol/L    Bicarbonate 27 21 - 32 mmol/L    Anion Gap 18 10 - 20 mmol/L    Urea Nitrogen 20 6 - 23 mg/dL    Creatinine 4.24 (H) 0.50 - 1.05 mg/dL    eGFR 10 (L) >60 mL/min/1.73m*2    Calcium 9.0 8.6 - 10.6 mg/dL    Albumin 3.6 3.4 - 5.0 g/dL    Alkaline Phosphatase 115 33 - 136 U/L    Total Protein 6.2 (L) 6.4 - 8.2 g/dL    AST 66 (H) 9 - 39 U/L    Bilirubin, Total 0.8 0.0 - 1.2 mg/dL    ALT 19 7 - 45 U/L   Protime-INR   Result Value Ref Range    Protime 14.2 (H) 9.8 - 12.8 seconds    INR 1.3 (H) 0.9 - 1.1   aPTT   Result Value Ref Range    aPTT 44 (H) 27 - 38 seconds   Blood Gas Venous Full Panel Unsolicited   Result Value Ref Range    POCT pH, Venous 7.48 (H) 7.33 - 7.43 pH    POCT pCO2, Venous 50 41 - 51 mm Hg    POCT pO2, Venous 46 (H) 35 - 45 mm Hg    POCT SO2, Venous 65 45 - 75 %    POCT Oxy Hemoglobin, Venous 62.1 45.0 - 75.0 %    POCT Hematocrit Calculated, Venous 28.0 (L) 36.0 - 46.0 %    POCT Sodium, Venous 132 (L) 136 - 145 mmol/L    POCT Potassium, Venous 5.2 3.5 - 5.3 mmol/L    POCT Chloride, Venous 100 98 - 107 mmol/L    POCT Ionized Calicum, Venous 1.06 (L) 1.10 - 1.33 mmol/L    POCT Glucose, Venous 122 (H) 74 - 99 mg/dL    POCT Lactate, Venous 2.8 (H) 0.4 - 2.0 mmol/L    POCT Base Excess, Venous 12.2 (H) -2.0 - 3.0 mmol/L    POCT HCO3 Calculated, Venous 37.2 (H) 22.0 - 26.0 mmol/L    POCT Hemoglobin, Venous 9.3 (L) 12.0 - 16.0 g/dL    POCT Anion Gap, Venous 0.0 (L) 10.0 - 25.0 mmol/L    Patient Temperature 37.0 degrees Celsius   CBC   Result Value Ref Range    WBC 3.9 (L) 4.4 - 11.3 x10*3/uL    nRBC 0.0 0.0 - 0.0 /100 WBCs    RBC 2.27 (L) 4.00 - 5.20 x10*6/uL    Hemoglobin 7.9 (L) 12.0 -  16.0 g/dL    Hematocrit 23.3 (L) 36.0 - 46.0 %     (H) 80 - 100 fL    MCH 34.8 (H) 26.0 - 34.0 pg    MCHC 33.9 32.0 - 36.0 g/dL    RDW 14.5 11.5 - 14.5 %    Platelets 81 (L) 150 - 450 x10*3/uL    MPV 10.4 7.5 - 11.5 fL   Type and screen   Result Value Ref Range    ABO TYPE O     Rh TYPE POS     ANTIBODY SCREEN NEG         Assessment/Plan       Chris Cole DO

## 2023-10-15 NOTE — SIGNIFICANT EVENT
Attending addendum 10/16/23: contacted by Ortho for verification of current ot status and Evidence Based periop and mortality RCRI risk, agree with NP risk stratification (as below) based on known and available data, and upon review of current clinical statu sand vitals remains stable for Ortho OR  DR Karrie Brock, IM Team Attending         Surgery Optimization  RCRI    ELEVATED SURGERY RISK No  HX OF ISCHEMIC HEART DISEASE No  HX OF CONGESTIVE HEART FAILURE No  HX OF CVA No  PRE OP INSULIN Yes  PRE OP CREAT >2MG/DL Yes      2 POINTS CLASS 3 RISK  10.1% 30 DAY RISK OF DEATH, mi OR CARDIAC ARREST           DASI Score    Take care of selfYes    Date:    Walk indoorsYes    Date:    Walk 1-2 blocks on level ground NO  Climb a flight of stairs/up a hillNo  Run a short distanceNo  Do light work around the houseNo  Do moderate work around the houseNo  Do heavy work around the houseNo  Do yardworkNo  Have sexual relationsNo  Participate in moderate rec activitiesNo  Participate in strenous rec activitiesNo    4.5 points    3.30 Mets

## 2023-10-15 NOTE — ED PROVIDER NOTES
Limitations to History: None  External Records Reviewed: 10/9/23 office visit note  Independent Historians: Patient, Family    HPI  Jamee Coronel is a 78 y.o. female with a history of osteomyelitis of the spine, osteoporosis, ESRD, DM, CAD, long term anticoagulants presenting today for a fall and right ankle pain. This morning walking back to bed the patient fell lading on her knees then bottom. She did not hit her head, or lose consciousness. She was not able to walk after the fall and was brought in by EMS. She denies any syncope. She has new pain in her right ankle and numbness in her left leg and foot from the knee down. She has not taken anything for the pain. Her pain is an 8/10. She denies any new back pain, nausea, vomiting, dizziness.     Last Monday 10/9, finished a full course of antibiotics for osteomyelitis of her lumbar spine, cleared by her infectious disease doctor. She has residual bilateral sciatica that is followed by her neurosurgeon. She uses a Rolator at home and lives with her sister and brother. She has multiple steps to get into the house. She feels as though the chronic back pain contributed to her fall.     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   gabapentin (NEURONTIN) 100 mg capsule Take 1 capsule by mouth twice daily for 30 days.   apixaban (ELIQUIS) 5 mg tab(s) Take 1 tablet by mouth twice daily.   methocarbamol (ROBAXIN) 500 mg tablet Take 1 tablet by mouth three times daily as needed.   diclofenac  "(FLECTOR) 1.3 % topical patch Apply 1 application as directed twice daily as needed.   vancomycin/water for inj, PEG, (VANCOMYCIN-DILUENT COMBO NO.1) 750 mg/150 mL pgbk Inject 750 mg intravenously three times a week. Every Tuesday Thursday and Saturday in Dialysis   cefepime (MAXIPIME) 2 g in D5W 100 mL Inject 100 mL intravenously three times a week. Every Tuesday Thursday and Saturday with dialysis   blood sugar diagnostic (CONTOUR NEXT TEST STRIPS) test strip USE TO TEST BLOOD SUGAR TWICE A DAY   calcium carbonate (TUMS ORAL) Take by mouth as needed.   Lancets (MICROLET LANCET) lancets Use to test BG's Three times daily. Dx code: E11.65, IDDM.   insulin aspart protamine-insulin aspart (NovoLOG 70-30) 100 unit/mL flexpen INJECT 10 UNITS SUBCUTANEOULY EVERY AM, THEN INJECT 8 UNITS EVERY PM   Insulin Needles, Disposable, (BD ULTRAFINE III MINI PEN) 31 gauge x 3/16\" USE FOR TWICE A DAY INJECTIONS. DX CODE: E11.3319, E11.65   acetaminophen (TYLENOL) 500 mg tablet Take 500 mg by mouth every 8 hours as needed.   meclizine (ANTIVERT) 25 mg tab Take 12.5 mg by mouth as needed (dizziness). MD instructed to take valium and antivert together   diazePAM (VALIUM) 10 mg tablet Take 5 mg by mouth as needed (for vertigo induced panic attacks).   omeprazole (PRILOSEC) 40 mg capsule Take 1 capsule by mouth once daily.   LILO CAPS 1 mg capsule TAKE 1 CAPSULE BY MOUTH ONCE DAILY   midodrine (PROAMITINE) 5 mg tablet Take 1 tablet by mouth every Tuesday, Thursday, and Saturday.   Lancing Device with Lancets (MICROLET 2 LANCING DEVICE) Test blood glucose tid (E11.9, uses insulin)     Allergies  Allergies   Allergen Reactions    Darbepoetin Tong-Albumin Unknown     Abdominal pain and headache    Povidone-Iodine Itching     Pt. States redness and itchiness to area        SHx  Social History     Tobacco Use    Smoking status: Former     Packs/day: 2.00     Years: 15.00     Additional pack years: 0.00     Total pack years: 30.00     Types: " "Cigarettes    Smokeless tobacco: Never   Substance Use Topics    Alcohol use: Never    Drug use: Never     ROS  Review of Systems   Constitutional:  Negative for chills and fever.   Respiratory:  Negative for shortness of breath.    Cardiovascular:  Negative for chest pain.   Gastrointestinal:  Negative for nausea and vomiting.   Musculoskeletal:  Positive for back pain and gait problem (Uses walker). Negative for neck pain and neck stiffness.   Neurological:  Positive for weakness and numbness. Negative for dizziness, syncope and light-headedness.     ------------------------------------------------------------------------------------------------------------------------------------------    /67 (BP Location: Left arm)   Pulse 75   Temp 35.7 °C (96.3 °F)   Resp 18   Ht 1.676 m (5' 6\")   Wt 79.4 kg (175 lb)   SpO2 99%   BMI 28.25 kg/m²     Physical Exam  Constitutional:       General: She is not in acute distress.     Appearance: Normal appearance.   HENT:      Head: Normocephalic and atraumatic.   Eyes:      Extraocular Movements: Extraocular movements intact.      Pupils: Pupils are equal, round, and reactive to light.   Cardiovascular:      Rate and Rhythm: Normal rate and regular rhythm.      Pulses: Normal pulses.           Dorsalis pedis pulses are 2+ on the right side and 2+ on the left side.      Heart sounds: Normal heart sounds.   Pulmonary:      Effort: Pulmonary effort is normal.      Breath sounds: Normal breath sounds.   Musculoskeletal:      Cervical back: Normal range of motion. No tenderness.      Right ankle: Swelling present. Decreased range of motion.      Right Achilles Tendon: No tenderness.      Right foot: Decreased range of motion. Bunion present.      Left foot: Normal range of motion. Bunion present.   Neurological:      Mental Status: She is alert. "       ------------------------------------------------------------------------------------------------------------------------------------------  Diagnoses as of 10/15/23 5996   Trimalleolar fracture of ankle, closed, right, initial encounter      Medications   lidocaine-epinephrine (Xylocaine W/EPI) 1 %-1:100,000 injection 20 mL (has no administration in time range)   HYDROmorphone (Dilaudid) injection 0.4 mg (0.4 mg intravenous Given 10/15/23 3612)   ondansetron (Zofran) injection 4 mg (4 mg intravenous Given 10/15/23 7724)        Medical Decision Making:   Jamee Coronel is a 78 y.o. female with a history of osteomyelitis of the spine, osteoporosis, ESRD, DM, CAD, long term anticoagulants for a fib presenting today for fall from standing and right ankle pain. She denies LOC or syncope. On physical exam she has palpable 2+ DP pulses bilaterally. Orthopedics consulted for Xray of ankle showing dislocation of the ankle and acute posterior malleolar and distal fibular fractures and subacute posterior medial malleolus fracture. Reduction and splinting done by otho with hematoma block. CT ankle/foot ordered to confirm post reduction. They plan to take her to the OR in tomorrow to stabilize the fracture. Trauma was consulted for admission and recommend medicine for admission. General medicine accepted the patient to the floor.     Jamee lives with her sister and brother at home. She has multiple steps to get into her home. She may need physical therapy and consider skilled nursing to recover from this injury.       Discussed with: Attending, Orthopendics         Marleny Argueta  10/15/23 5687

## 2023-10-16 ENCOUNTER — ANESTHESIA EVENT (OUTPATIENT)
Dept: OPERATING ROOM | Facility: HOSPITAL | Age: 78
DRG: 492 | End: 2023-10-16
Payer: MEDICARE

## 2023-10-16 ENCOUNTER — APPOINTMENT (OUTPATIENT)
Dept: RADIOLOGY | Facility: HOSPITAL | Age: 78
DRG: 492 | End: 2023-10-16
Payer: MEDICARE

## 2023-10-16 ENCOUNTER — APPOINTMENT (OUTPATIENT)
Dept: CARDIOLOGY | Facility: HOSPITAL | Age: 78
DRG: 492 | End: 2023-10-16
Payer: MEDICARE

## 2023-10-16 ENCOUNTER — ANESTHESIA (OUTPATIENT)
Dept: OPERATING ROOM | Facility: HOSPITAL | Age: 78
DRG: 492 | End: 2023-10-16
Payer: MEDICARE

## 2023-10-16 LAB
ABO GROUP (TYPE) IN BLOOD: NORMAL
ATRIAL RATE: 71 BPM
BLOOD EXPIRATION DATE: NORMAL
DISPENSE STATUS: NORMAL
GLUCOSE BLD MANUAL STRIP-MCNC: 112 MG/DL (ref 74–99)
GLUCOSE BLD MANUAL STRIP-MCNC: 128 MG/DL (ref 74–99)
GLUCOSE BLD MANUAL STRIP-MCNC: 143 MG/DL (ref 74–99)
GLUCOSE BLD MANUAL STRIP-MCNC: 96 MG/DL (ref 74–99)
GLUCOSE BLD MANUAL STRIP-MCNC: 99 MG/DL (ref 74–99)
P AXIS: 38 DEGREES
P OFFSET: 198 MS
P ONSET: 131 MS
PR INTERVAL: 170 MS
PRODUCT BLOOD TYPE: 6200
PRODUCT CODE: NORMAL
Q ONSET: 216 MS
QRS COUNT: 12 BEATS
QRS DURATION: 74 MS
QT INTERVAL: 422 MS
QTC CALCULATION(BAZETT): 458 MS
QTC FREDERICIA: 446 MS
R AXIS: 100 DEGREES
RH FACTOR (ANTIGEN D): NORMAL
T AXIS: 7 DEGREES
T OFFSET: 427 MS
UNIT ABO: NORMAL
UNIT NUMBER: NORMAL
UNIT RH: NORMAL
UNIT VOLUME: 206
VENTRICULAR RATE: 71 BPM

## 2023-10-16 PROCEDURE — 2500000004 HC RX 250 GENERAL PHARMACY W/ HCPCS (ALT 636 FOR OP/ED): Performed by: ORTHOPAEDIC SURGERY

## 2023-10-16 PROCEDURE — 82947 ASSAY GLUCOSE BLOOD QUANT: CPT

## 2023-10-16 PROCEDURE — P9016 RBC LEUKOCYTES REDUCED: HCPCS

## 2023-10-16 PROCEDURE — 3600000009 HC OR TIME - EACH INCREMENTAL 1 MINUTE - PROCEDURE LEVEL FOUR: Performed by: ORTHOPAEDIC SURGERY

## 2023-10-16 PROCEDURE — P9037 PLATE PHERES LEUKOREDU IRRAD: HCPCS

## 2023-10-16 PROCEDURE — 82947 ASSAY GLUCOSE BLOOD QUANT: CPT | Mod: CMCLAB

## 2023-10-16 PROCEDURE — 2500000001 HC RX 250 WO HCPCS SELF ADMINISTERED DRUGS (ALT 637 FOR MEDICARE OP): Performed by: NURSE PRACTITIONER

## 2023-10-16 PROCEDURE — 76000 FLUOROSCOPY <1 HR PHYS/QHP: CPT

## 2023-10-16 PROCEDURE — 7100000002 HC RECOVERY ROOM TIME - EACH INCREMENTAL 1 MINUTE: Performed by: ORTHOPAEDIC SURGERY

## 2023-10-16 PROCEDURE — A4217 STERILE WATER/SALINE, 500 ML: HCPCS | Performed by: ORTHOPAEDIC SURGERY

## 2023-10-16 PROCEDURE — C1776 JOINT DEVICE (IMPLANTABLE): HCPCS | Performed by: ORTHOPAEDIC SURGERY

## 2023-10-16 PROCEDURE — 27822 TREATMENT OF ANKLE FRACTURE: CPT | Performed by: ORTHOPAEDIC SURGERY

## 2023-10-16 PROCEDURE — 2500000005 HC RX 250 GENERAL PHARMACY W/O HCPCS

## 2023-10-16 PROCEDURE — 0QSG36Z REPOSITION RIGHT TIBIA WITH INTRAMEDULLARY INTERNAL FIXATION DEVICE, PERCUTANEOUS APPROACH: ICD-10-PCS | Performed by: ORTHOPAEDIC SURGERY

## 2023-10-16 PROCEDURE — 0QSL34Z REPOSITION RIGHT TARSAL WITH INTERNAL FIXATION DEVICE, PERCUTANEOUS APPROACH: ICD-10-PCS | Performed by: ORTHOPAEDIC SURGERY

## 2023-10-16 PROCEDURE — 2580000001 HC RX 258 IV SOLUTIONS

## 2023-10-16 PROCEDURE — 36430 TRANSFUSION BLD/BLD COMPNT: CPT

## 2023-10-16 PROCEDURE — 36415 COLL VENOUS BLD VENIPUNCTURE: CPT | Performed by: STUDENT IN AN ORGANIZED HEALTH CARE EDUCATION/TRAINING PROGRAM

## 2023-10-16 PROCEDURE — 99233 SBSQ HOSP IP/OBS HIGH 50: CPT | Performed by: INTERNAL MEDICINE

## 2023-10-16 PROCEDURE — 3700000001 HC GENERAL ANESTHESIA TIME - INITIAL BASE CHARGE: Performed by: ORTHOPAEDIC SURGERY

## 2023-10-16 PROCEDURE — 3700000002 HC GENERAL ANESTHESIA TIME - EACH INCREMENTAL 1 MINUTE: Performed by: ORTHOPAEDIC SURGERY

## 2023-10-16 PROCEDURE — 27822 TREATMENT OF ANKLE FRACTURE: CPT | Performed by: STUDENT IN AN ORGANIZED HEALTH CARE EDUCATION/TRAINING PROGRAM

## 2023-10-16 PROCEDURE — 86901 BLOOD TYPING SEROLOGIC RH(D): CPT | Performed by: STUDENT IN AN ORGANIZED HEALTH CARE EDUCATION/TRAINING PROGRAM

## 2023-10-16 PROCEDURE — 99100 ANES PT EXTEME AGE<1 YR&>70: CPT | Performed by: STUDENT IN AN ORGANIZED HEALTH CARE EDUCATION/TRAINING PROGRAM

## 2023-10-16 PROCEDURE — 2500000004 HC RX 250 GENERAL PHARMACY W/ HCPCS (ALT 636 FOR OP/ED)

## 2023-10-16 PROCEDURE — 93005 ELECTROCARDIOGRAM TRACING: CPT

## 2023-10-16 PROCEDURE — 51702 INSERT TEMP BLADDER CATH: CPT

## 2023-10-16 PROCEDURE — 7100000001 HC RECOVERY ROOM TIME - INITIAL BASE CHARGE: Performed by: ORTHOPAEDIC SURGERY

## 2023-10-16 PROCEDURE — A27822 PR OPEN TX TRIMALLEOLAR ANKLE FX W/O FIX PST LIP

## 2023-10-16 PROCEDURE — 3600000004 HC OR TIME - INITIAL BASE CHARGE - PROCEDURE LEVEL FOUR: Performed by: ORTHOPAEDIC SURGERY

## 2023-10-16 PROCEDURE — 2060000001 HC INTERMEDIATE ICU ROOM DAILY

## 2023-10-16 PROCEDURE — 2500000004 HC RX 250 GENERAL PHARMACY W/ HCPCS (ALT 636 FOR OP/ED): Performed by: NURSE PRACTITIONER

## 2023-10-16 PROCEDURE — A27822 PR OPEN TX TRIMALLEOLAR ANKLE FX W/O FIX PST LIP: Performed by: STUDENT IN AN ORGANIZED HEALTH CARE EDUCATION/TRAINING PROGRAM

## 2023-10-16 PROCEDURE — 5A1D70Z PERFORMANCE OF URINARY FILTRATION, INTERMITTENT, LESS THAN 6 HOURS PER DAY: ICD-10-PCS | Performed by: INTERNAL MEDICINE

## 2023-10-16 DEVICE — ARTHRODESIS NAIL SYSTEM GUIDE WIRE Ø3.2MM X 600MM
Type: IMPLANTABLE DEVICE | Site: ANKLE | Status: NON-FUNCTIONAL
Brand: PANTA® 2

## 2023-10-16 RX ORDER — CEFAZOLIN 1 G/1
INJECTION, POWDER, FOR SOLUTION INTRAVENOUS AS NEEDED
Status: DISCONTINUED | OUTPATIENT
Start: 2023-10-16 | End: 2023-10-16

## 2023-10-16 RX ORDER — SODIUM CHLORIDE 0.9 G/100ML
IRRIGANT IRRIGATION AS NEEDED
Status: DISCONTINUED | OUTPATIENT
Start: 2023-10-16 | End: 2023-10-16 | Stop reason: HOSPADM

## 2023-10-16 RX ORDER — HYDRALAZINE HYDROCHLORIDE 20 MG/ML
5 INJECTION INTRAMUSCULAR; INTRAVENOUS EVERY 30 MIN PRN
Status: DISCONTINUED | OUTPATIENT
Start: 2023-10-16 | End: 2023-10-16 | Stop reason: HOSPADM

## 2023-10-16 RX ORDER — PHENYLEPHRINE HCL IN 0.9% NACL 0.4MG/10ML
SYRINGE (ML) INTRAVENOUS AS NEEDED
Status: DISCONTINUED | OUTPATIENT
Start: 2023-10-16 | End: 2023-10-16

## 2023-10-16 RX ORDER — SODIUM CHLORIDE 9 MG/ML
INJECTION, SOLUTION INTRAVENOUS CONTINUOUS PRN
Status: DISCONTINUED | OUTPATIENT
Start: 2023-10-16 | End: 2023-10-16

## 2023-10-16 RX ORDER — ACETAMINOPHEN 500 MG
2000 TABLET ORAL ONCE
Status: CANCELLED | OUTPATIENT
Start: 2023-10-16 | End: 2023-10-16

## 2023-10-16 RX ORDER — OXYCODONE HYDROCHLORIDE 5 MG/1
5 TABLET ORAL EVERY 4 HOURS PRN
Status: DISCONTINUED | OUTPATIENT
Start: 2023-10-16 | End: 2023-10-16 | Stop reason: HOSPADM

## 2023-10-16 RX ORDER — FENTANYL CITRATE 50 UG/ML
INJECTION, SOLUTION INTRAMUSCULAR; INTRAVENOUS AS NEEDED
Status: DISCONTINUED | OUTPATIENT
Start: 2023-10-16 | End: 2023-10-16

## 2023-10-16 RX ORDER — DROPERIDOL 2.5 MG/ML
0.62 INJECTION, SOLUTION INTRAMUSCULAR; INTRAVENOUS ONCE AS NEEDED
Status: DISCONTINUED | OUTPATIENT
Start: 2023-10-16 | End: 2023-10-16 | Stop reason: HOSPADM

## 2023-10-16 RX ORDER — PROPOFOL 10 MG/ML
INJECTION, EMULSION INTRAVENOUS AS NEEDED
Status: DISCONTINUED | OUTPATIENT
Start: 2023-10-16 | End: 2023-10-16

## 2023-10-16 RX ORDER — PHENYLEPHRINE 10 MG/250 ML(40 MCG/ML)IN 0.9 % SOD.CHLORIDE INTRAVENOUS
CONTINUOUS PRN
Status: DISCONTINUED | OUTPATIENT
Start: 2023-10-16 | End: 2023-10-16

## 2023-10-16 RX ORDER — SODIUM CHLORIDE, SODIUM LACTATE, POTASSIUM CHLORIDE, CALCIUM CHLORIDE 600; 310; 30; 20 MG/100ML; MG/100ML; MG/100ML; MG/100ML
INJECTION, SOLUTION INTRAVENOUS CONTINUOUS PRN
Status: DISCONTINUED | OUTPATIENT
Start: 2023-10-16 | End: 2023-10-16

## 2023-10-16 RX ORDER — LIDOCAINE HYDROCHLORIDE 10 MG/ML
0.1 INJECTION, SOLUTION EPIDURAL; INFILTRATION; INTRACAUDAL; PERINEURAL ONCE
Status: DISCONTINUED | OUTPATIENT
Start: 2023-10-16 | End: 2023-10-16 | Stop reason: HOSPADM

## 2023-10-16 RX ORDER — OXYCODONE HYDROCHLORIDE 5 MG/1
10 TABLET ORAL EVERY 4 HOURS PRN
Status: DISCONTINUED | OUTPATIENT
Start: 2023-10-16 | End: 2023-10-16 | Stop reason: HOSPADM

## 2023-10-16 RX ORDER — LIDOCAINE HCL/PF 100 MG/5ML
SYRINGE (ML) INTRAVENOUS AS NEEDED
Status: DISCONTINUED | OUTPATIENT
Start: 2023-10-16 | End: 2023-10-16

## 2023-10-16 RX ORDER — ONDANSETRON HYDROCHLORIDE 2 MG/ML
4 INJECTION, SOLUTION INTRAVENOUS ONCE AS NEEDED
Status: DISCONTINUED | OUTPATIENT
Start: 2023-10-16 | End: 2023-10-16 | Stop reason: HOSPADM

## 2023-10-16 RX ORDER — MEPERIDINE HYDROCHLORIDE 25 MG/ML
12.5 INJECTION INTRAMUSCULAR; INTRAVENOUS; SUBCUTANEOUS EVERY 10 MIN PRN
Status: DISCONTINUED | OUTPATIENT
Start: 2023-10-16 | End: 2023-10-16 | Stop reason: HOSPADM

## 2023-10-16 RX ORDER — DEXAMETHASONE SODIUM PHOSPHATE 4 MG/ML
INJECTION, SOLUTION INTRA-ARTICULAR; INTRALESIONAL; INTRAMUSCULAR; INTRAVENOUS; SOFT TISSUE AS NEEDED
Status: DISCONTINUED | OUTPATIENT
Start: 2023-10-16 | End: 2023-10-16

## 2023-10-16 RX ORDER — CEFAZOLIN SODIUM 2 G/100ML
2 INJECTION, SOLUTION INTRAVENOUS EVERY 8 HOURS
Status: CANCELLED | OUTPATIENT
Start: 2023-10-16 | End: 2023-10-17

## 2023-10-16 RX ORDER — ROCURONIUM BROMIDE 10 MG/ML
INJECTION, SOLUTION INTRAVENOUS AS NEEDED
Status: DISCONTINUED | OUTPATIENT
Start: 2023-10-16 | End: 2023-10-16

## 2023-10-16 RX ORDER — LABETALOL HYDROCHLORIDE 5 MG/ML
5 INJECTION, SOLUTION INTRAVENOUS ONCE AS NEEDED
Status: DISCONTINUED | OUTPATIENT
Start: 2023-10-16 | End: 2023-10-16 | Stop reason: HOSPADM

## 2023-10-16 RX ORDER — TRANEXAMIC ACID 100 MG/ML
INJECTION, SOLUTION INTRAVENOUS AS NEEDED
Status: DISCONTINUED | OUTPATIENT
Start: 2023-10-16 | End: 2023-10-16

## 2023-10-16 RX ORDER — ALBUTEROL SULFATE 0.83 MG/ML
2.5 SOLUTION RESPIRATORY (INHALATION) ONCE AS NEEDED
Status: DISCONTINUED | OUTPATIENT
Start: 2023-10-16 | End: 2023-10-16 | Stop reason: HOSPADM

## 2023-10-16 RX ORDER — DIPHENHYDRAMINE HYDROCHLORIDE 50 MG/ML
12.5 INJECTION INTRAMUSCULAR; INTRAVENOUS ONCE AS NEEDED
Status: DISCONTINUED | OUTPATIENT
Start: 2023-10-16 | End: 2023-10-16 | Stop reason: HOSPADM

## 2023-10-16 RX ORDER — SODIUM CHLORIDE 9 MG/ML
20 INJECTION, SOLUTION INTRAVENOUS CONTINUOUS
Status: DISCONTINUED | OUTPATIENT
Start: 2023-10-16 | End: 2023-10-16 | Stop reason: HOSPADM

## 2023-10-16 RX ADMIN — NYSTATIN 1 APPLICATION: 100000 POWDER TOPICAL at 16:52

## 2023-10-16 RX ADMIN — Medication 100 MCG: at 12:00

## 2023-10-16 RX ADMIN — ONDANSETRON 4 MG: 2 INJECTION INTRAMUSCULAR; INTRAVENOUS at 13:23

## 2023-10-16 RX ADMIN — SENNOSIDES AND DOCUSATE SODIUM 2 TABLET: 50; 8.6 TABLET ORAL at 20:52

## 2023-10-16 RX ADMIN — Medication 120 MCG: at 12:13

## 2023-10-16 RX ADMIN — TRANEXAMIC ACID 1000 MG: 100 INJECTION, SOLUTION INTRAVENOUS at 11:52

## 2023-10-16 RX ADMIN — Medication 160 MCG: at 11:50

## 2023-10-16 RX ADMIN — DEXAMETHASONE SODIUM PHOSPHATE 8 MG: 4 INJECTION, SOLUTION INTRAMUSCULAR; INTRAVENOUS at 11:38

## 2023-10-16 RX ADMIN — CEFAZOLIN 2 G: 330 INJECTION, POWDER, FOR SOLUTION INTRAMUSCULAR; INTRAVENOUS at 11:52

## 2023-10-16 RX ADMIN — PROPOFOL 130 MG: 10 INJECTION, EMULSION INTRAVENOUS at 11:35

## 2023-10-16 RX ADMIN — SODIUM CHLORIDE: 0.9 INJECTION, SOLUTION INTRAVENOUS at 11:52

## 2023-10-16 RX ADMIN — LIDOCAINE HYDROCHLORIDE 50 MG: 20 INJECTION INTRAVENOUS at 11:35

## 2023-10-16 RX ADMIN — Medication 0.2 MCG/KG/MIN: at 11:51

## 2023-10-16 RX ADMIN — SODIUM CHLORIDE, POTASSIUM CHLORIDE, SODIUM LACTATE AND CALCIUM CHLORIDE: 600; 310; 30; 20 INJECTION, SOLUTION INTRAVENOUS at 11:34

## 2023-10-16 RX ADMIN — GABAPENTIN 200 MG: 100 CAPSULE ORAL at 20:52

## 2023-10-16 RX ADMIN — ACETAMINOPHEN 975 MG: 325 TABLET ORAL at 16:51

## 2023-10-16 RX ADMIN — FENTANYL CITRATE 100 MCG: 50 INJECTION, SOLUTION INTRAMUSCULAR; INTRAVENOUS at 11:35

## 2023-10-16 RX ADMIN — ROCURONIUM BROMIDE 50 MG: 10 INJECTION, SOLUTION INTRAVENOUS at 11:36

## 2023-10-16 RX ADMIN — SUGAMMADEX 200 MG: 100 INJECTION, SOLUTION INTRAVENOUS at 13:31

## 2023-10-16 RX ADMIN — NYSTATIN 1 APPLICATION: 100000 POWDER TOPICAL at 20:59

## 2023-10-16 RX ADMIN — HYDROMORPHONE HYDROCHLORIDE 0.6 MG: 1 INJECTION, SOLUTION INTRAMUSCULAR; INTRAVENOUS; SUBCUTANEOUS at 13:04

## 2023-10-16 ASSESSMENT — COGNITIVE AND FUNCTIONAL STATUS - GENERAL
STANDING UP FROM CHAIR USING ARMS: TOTAL
DRESSING REGULAR UPPER BODY CLOTHING: A LOT
TURNING FROM BACK TO SIDE WHILE IN FLAT BAD: A LOT
MOVING TO AND FROM BED TO CHAIR: TOTAL
HELP NEEDED FOR BATHING: A LOT
WALKING IN HOSPITAL ROOM: TOTAL
PERSONAL GROOMING: A LOT
MOVING FROM LYING ON BACK TO SITTING ON SIDE OF FLAT BED WITH BEDRAILS: A LOT
MOBILITY SCORE: 8
EATING MEALS: A LITTLE
DAILY ACTIVITIY SCORE: 13
CLIMB 3 TO 5 STEPS WITH RAILING: TOTAL
TOILETING: A LOT
DRESSING REGULAR LOWER BODY CLOTHING: A LOT

## 2023-10-16 ASSESSMENT — PAIN SCALES - GENERAL
PAINLEVEL_OUTOF10: 0 - NO PAIN
PAINLEVEL_OUTOF10: 5 - MODERATE PAIN
PAINLEVEL_OUTOF10: 0 - NO PAIN
PAINLEVEL_OUTOF10: 1

## 2023-10-16 ASSESSMENT — COLUMBIA-SUICIDE SEVERITY RATING SCALE - C-SSRS
2. HAVE YOU ACTUALLY HAD ANY THOUGHTS OF KILLING YOURSELF?: NO
1. IN THE PAST MONTH, HAVE YOU WISHED YOU WERE DEAD OR WISHED YOU COULD GO TO SLEEP AND NOT WAKE UP?: NO
6. HAVE YOU EVER DONE ANYTHING, STARTED TO DO ANYTHING, OR PREPARED TO DO ANYTHING TO END YOUR LIFE?: NO

## 2023-10-16 ASSESSMENT — PAIN - FUNCTIONAL ASSESSMENT
PAIN_FUNCTIONAL_ASSESSMENT: 0-10

## 2023-10-16 NOTE — ANESTHESIA PREPROCEDURE EVALUATION
Patient: Jamee Coronel    Procedure Information       Date/Time: 10/16/23 0955    Procedure: Hindfoot nail for trimal fx/dx (Right: Ankle)    Location: [facility] OR 01 / [facility]    Surgeons: Rogelio Contreras MD            Relevant Problems   Anesthesia (within normal limits)      Cardiovascular   (+) Atrial fibrillation (CMS/HCC)   (+) CAD (coronary artery disease)   (+) Essential hypertension   (+) Heart murmur previously undiagnosed   (+) Pulmonary HTN (CMS/HCC)   (+) Tricuspid valve regurgitation      Endocrine   (+) Secondary renal hyperparathyroidism (CMS/HCC)   (+) Type 2 diabetes mellitus with nephropathy (CMS/HCC)      /Renal   (+) ESRD (end stage renal disease) on dialysis (CMS/HCC) (HD on Saturday, Tuesday and Thursday )   (+) ESRD on hemodialysis (CMS/HCC)   (+) Liver cirrhosis secondary to GEORGE (CMS/HCC) (With grade 1 esophageal varices banded at [facility])      Pulmonary   (+) Pulmonary HTN (CMS/HCC)      GI/Hepatic   (+) Liver cirrhosis secondary to GEORGE (CMS/HCC) (With grade 1 esophageal varices banded at [facility])      Hematology   (+) Pancytopenia (CMS/HCC)      Musculoskeletal   (+) Discitis of lumbar region      Infectious Disease   (+) Osteomyelitis of spine (CMS/HCC)      Other   (+) Discitis of lumbar region   (+) Osteomyelitis of spine (CMS/HCC)     Echo [facility] 8/2023:  CONCLUSIONS:   - Technically difficult exam due to suboptimal positioning.   - Exam indication: Native valvular stenosis   - The left ventricle is normal in size. Left ventricular systolic function is   normal. EF = 62 ± 5% (2D biplane)   - The right ventricle is normal in size. Right ventricular systolic function is   normal.   - The left atrial cavity is dilated.   - Estimated right ventricular systolic pressure is likely underestimated due to a   weak or incomplete tricuspid regurgitation signal and is, at least, 65 mmHg   consistent with moderate pulmonary hypertension. Estimated right atrial pressure   is 15 mmHg  based on IVC assessment.   - Exam was compared with the prior [facility] echocardiographic exam performed on   11/29/2019.Similar findings   Electronically signed by Talya Guzmán MD on 8/30/2023 at 11:17:17 AM       Results for orders placed or performed during the hospital encounter of 10/15/23 (from the past 24 hour(s))   CBC   Result Value Ref Range    WBC 3.9 (L) 4.4 - 11.3 x10*3/uL    nRBC 0.0 0.0 - 0.0 /100 WBCs    RBC 2.27 (L) 4.00 - 5.20 x10*6/uL    Hemoglobin 7.9 (L) 12.0 - 16.0 g/dL    Hematocrit 23.3 (L) 36.0 - 46.0 %     (H) 80 - 100 fL    MCH 34.8 (H) 26.0 - 34.0 pg    MCHC 33.9 32.0 - 36.0 g/dL    RDW 14.5 11.5 - 14.5 %    Platelets 81 (L) 150 - 450 x10*3/uL    MPV 10.4 7.5 - 11.5 fL   Type and screen   Result Value Ref Range    ABO TYPE O     Rh TYPE POS     ANTIBODY SCREEN NEG    POCT GLUCOSE   Result Value Ref Range    POCT Glucose 143 (H) 74 - 99 mg/dL   SARS-CoV-2 RT PCR   Result Value Ref Range    Coronavirus 2019, PCR Not Detected Not Detected   POCT GLUCOSE   Result Value Ref Range    POCT Glucose 112 (H) 74 - 99 mg/dL   ECG 12 lead   Result Value Ref Range    Ventricular Rate 71 BPM    Atrial Rate 71 BPM    TX Interval 170 ms    QRS Duration 74 ms    QT Interval 422 ms    QTC Calculation(Bazett) 458 ms    P Axis 38 degrees    R Axis 100 degrees    T Axis 7 degrees    QRS Count 12 beats    Q Onset 216 ms    P Onset 131 ms    P Offset 198 ms    T Offset 427 ms    QTC Fredericia 446 ms   POCT GLUCOSE   Result Value Ref Range    POCT Glucose 96 74 - 99 mg/dL      Clinical information reviewed:   Tobacco  Allergies  Meds  Problems  Med Hx  Surg Hx  OB Status    Fam Hx  Soc Hx        NPO Detail:  NPO/Void Status  Date of Last Liquid: 10/16/23  Time of Last Liquid: 0000  Date of Last Solid: 10/16/23  Time of Last Solid: 0000         Physical Exam    Airway  Mallampati: III  TM distance: >3 FB  Neck ROM: full     Cardiovascular   Rhythm: irregular  Rate: abnormal     Dental        Pulmonary -  normal exam     Abdominal - normal exam             Anesthesia Plan    ASA 3     general     intravenous induction   Postoperative administration of opioids is intended.  Anesthetic plan and risks discussed with patient.  Use of blood products discussed with patient who.    Plan discussed with CRNA.

## 2023-10-16 NOTE — ANESTHESIA PROCEDURE NOTES
Peripheral IV  Date/Time: 10/16/2023 11:42 AM      Placement  Needle size: 18 G  Laterality: left  Location: hand  Site prep: alcohol  Technique: anatomical landmarks  Attempts: 1

## 2023-10-16 NOTE — CARE PLAN
Problem: Fall/Injury  Goal: Verbalize understanding of personal risk factors for fall in the hospital  Outcome: Progressing   The patient's goals for the shift include have minimal pain    The clinical goals for the shift include decrease pain

## 2023-10-16 NOTE — CARE PLAN
The patient's goals for the shift include have minimal pain    The clinical goals for the shift include decrease pain      Problem: Fall/Injury  Goal: Be free from injury by end of the shift  10/15/2023 2018 by Marleny Mora RN  Outcome: Progressing  10/15/2023 2014 by Marleny Mora RN  Outcome: Progressing  10/15/2023 2008 by Marleny Mora RN  Outcome: Progressing     Problem: Fall/Injury  Goal: Verbalize understanding of personal risk factors for fall in the hospital  10/15/2023 2018 by Marleny Mora RN  Outcome: Progressing  10/15/2023 2014 by Marleny Mora RN  Outcome: Progressing  10/15/2023 2008 by Marleny Mora RN  Outcome: Progressing     Problem: Fall/Injury  Goal: Verbalize understanding of risk factor reduction measures to prevent injury from fall in the home  10/15/2023 2018 by Marleny Mora RN  Outcome: Progressing  10/15/2023 2014 by Marleny Mora RN  Outcome: Progressing  10/15/2023 2008 by Marleny Mora RN  Outcome: Progressing

## 2023-10-16 NOTE — CARE PLAN
Problem: Skin  Goal: Decreased wound size/increased tissue granulation at next dressing change  10/15/2023 2019 by Marleny Mora RN  Outcome: Progressing  10/15/2023 2019 by Marleny Mora RN  Outcome: Progressing  10/15/2023 2018 by Marleny Mora RN  Outcome: Progressing  10/15/2023 2014 by Marleny Mora RN  Outcome: Progressing  10/15/2023 2008 by Marleny Mora RN  Outcome: Progressing   The patient's goals for the shift include have minimal pain

## 2023-10-16 NOTE — ANESTHESIA PROCEDURE NOTES
Airway  Date/Time: 10/16/2023 11:39 AM  Urgency: elective    Airway not difficult    Staffing  Performed: CRNA   Authorized by: Lashay Gill MD    Performed by: KIN Fagan-ED  Patient location during procedure: OR    Indications and Patient Condition  Indications for airway management: anesthesia  Spontaneous ventilation: present  Preoxygenated: yes  MILS maintained throughout  Mask difficulty assessment: 1 - vent by mask    Final Airway Details  Final airway type: endotracheal airway      Successful airway: ETT  Cuffed: yes   Successful intubation technique: video laryngoscopy  Facilitating devices/methods: intubating stylet  Endotracheal tube insertion site: oral  Blade size: #3  ETT size (mm): 7.0  Cormack-Lehane Classification: grade I - full view of glottis  Placement verified by: capnometry   Cuff volume (mL): 8  Measured from: lips  ETT to lips (cm): 21  Number of attempts at approach: 1

## 2023-10-16 NOTE — OP NOTE
ORTHOPAEDIC SURGERY OPERATIVE REPORT      Date of Surgery: 10/16/2023    Surgeon: Rogelio Contreras MD    Assistant Surgeon: MD Dr. Adonay Carty participated in this case as the assistant surgeon, performing components of the positioning, approach, debridement, reduction, fixation, and closure. Due to the nature and complexity of the case, no qualified resident of an appropriate level was available to assist.    Preoperative Diagnosis:  Right trimalleolar ankle fracture    Postoperative Diagnosis:  Right trimalleolar ankle fracture    Procedures Performed:  Right trimalleolar ankle fracture closed reduction and retrograde tibiotalocalcaneal intramedullary nailing, without fixation of the posterior malleolus    Anesthesia: General anesthesia    IV Fluids: Per anesthesia record    Tourniquet Time: None    Estimated Blood Loss: 75 mL    Complications: None    Implants:   [employer] Panta 2 12 x 240 mm nail    Specimens: None    Intraoperative Findings: Stable fracture fixation and appropriate hardware placement noted at the completion of the case.    Indications For Procedure:  Jamee Coronel is a 78 y.o. female who sustained a ground-level fall and injured her right lower extremity.  Upon presentation, patient was noted to have a right trimalleolar fracture dislocation.  The patient underwent closed reduction and splint application in the emergency department.  Patient has multiple multiple medical comorbidities including CKD on dialysis, insulin-dependent diabetes, and recent completion of IV antibiotics for lumbar spine osteomyelitis and discitis.  Given the patient's multiple medical comorbidities, usual incision is made in proceeding with hindfoot fusion nail and lieu of open reduction internal fixation as it was felt that the risk of multiple incisions would be poorly tolerated patient be likely to go on to a nonunion. Due to the nature of the patient's injury, they were indicated for  operative stabilization.  Informed consent was obtained after discussing the risks, benefits, and alternatives to the procedure.  Risks include pain, bleeding, infection, damage to nearby structures, malunion, nonunion, hardware complications, need for further procedures, blood clots, anesthesia risks, and death.  Decision was made to proceed.  The patient was then brought to the preoperative holding area on the day of surgery.    Procedure Detail:  The patient was met in the preoperative holding area where their identity was confirmed and the operative extremity was marked. The patient was taken back to the operating theater where a preinduction timeout was performed which confirmed the patient's identity, procedure to be performed, correct operative site, availability of imaging and implants, allergies, and preoperative antibiotics. Everyone present was in agreement. They were placed under general anesthesia without complication. The patient was transferred to the operating table and placed in the prone position. All bony prominences were well-padded. The patient's right lower extremity was then prepped and draped in the usual sterile fashion. A preprocedure timeout was performed which again confirmed the patient's identity, procedure to be performed, and correct operative site.  Everyone present was in agreement. Preoperative antibiotics were administered.    We began by first identifying an appropriate starting point for our guidewire with use of fluoroscopy.  Once appropriate position was confirmed, the guidewire was percutaneously inserted at the level of the calcaneus and then advanced with power up through the subtalar joint and the tibiotalar joint. The ankle was held in a reduced position and with neutral dorsiflexion at this time. Fluoroscopy again demonstrated appropriate pin position.  Incision was then made over top of the wire and opening reamer was placed within the wound and advanced to bone.   Opening reamer was used we then began sequentially reaming up to a size 13.5 mm reamer while holding our reduction and neutral dorsiflexion.  Our wire measured approximately 280 mm and we felt confident using a 240 mm nail.  We then selected our [employer] Panta 2 12x240 mm TTC hindfoot fusion nail and inserted this over the guidewire.  This was seated to an appropriate depth.  We used the aiming jig to place our to posterior to anterior calcaneal screws.  These had excellent purchase and were noted to be well-positioned.  We then used the aiming jig to place 2 proximal medial to lateral interlock screws, followed by a lateral to medial talar screw.  All screws were noted to have adequate purchase.  The jig was removed and the setscrew was placed.  We obtained our final fluoroscopic images which demonstrated appropriate fracture reduction, ankle position, and appropriate hardware position without evidence of complication.  All incisions were copiously irrigated with normal saline.  The deep dermal layer was closed with 2-0 Monocryl suture followed by 2-0 nylon suture in a vertical mattress fashion for the skin.  All counts were correct x2 at this time.  Sterile dressings were applied and the patient was placed into a well-padded short leg splint for soft tissue rest.    The drapes were taken down and the patient was awoken from anesthesia without complication. They were transferred back to their hospital bed and taken to PACU in stable condition.    Postoperative Plan:  The patient will be nonweightbearing to the right lower extremity at this time.  Once the patient surgical incisions heal in approximately 2-3 weeks, we would allow the patient to advance to weightbearing as tolerated.  The patient will receive postoperative antibiotics.  The patient will resume DVT prophylaxis per the primary team.  Patient will receive evaluations by PT and OT. The patient will follow up with Dr. Rogelio Contreras MD in 3 weeks  time for clinical check, suture removal, and 3 view xrays of the right ankle (AP/lateral/mortise) and 2 view x-rays of the right tib-fib at that time.      Dr. Rogelio Contreras MD was present for the critical portions of the case and was otherwise immediately available to assist.      Guilherme Urias MD  Orthopaedic Trauma Fellow  10/16/2023

## 2023-10-16 NOTE — PERIOPERATIVE NURSING NOTE
7297- report received from service and anesthesia. Pt can wiggle toes. Pt can feel rn touching toes. Foot is warm to touch. Post op sugar is 99.  1430- returned report given to kailee Rose rn.

## 2023-10-16 NOTE — PROGRESS NOTES
Patient discussed during interdisciplinary rounds.   Team members present: TCC, medical team  Plan per Medical/Surgical team: Patient to OR today for ORIF.   Payer: Medicare A/B  Status: Inpatient  Discharge disposition: TBD   Potential Barriers: None noted  ADOD: TBD  Marleny Mcbride, RN, TCC

## 2023-10-16 NOTE — PROGRESS NOTES
Physical Therapy                 Therapy Communication Note    Patient Name: Jamee Coronel  MRN: [id number]  Today's Date: 10/16/2023     Discipline: Physical Therapy    Missed Visit Reason: Missed Visit Reason: Other (Comment) (OR planned for this date.)    Missed Time: Attempt    Comment:  Will follow and re-attempt as appropriate following OR.

## 2023-10-16 NOTE — PROGRESS NOTES
"ORTHOPAEDIC SURGERY PROGRESS NOTE      Jamee Coronel is a 78 y.o. female on day 1 of admission presenting with Trimalleolar fracture of ankle, closed, right, initial encounter, now s/p R  hind foot nail for tri mal ankle fx on 10/16  with Dr. Contreras     Subjective   Seen and examined postoperatively. NAEO. AFVSS. NAD, pain well controlled. No numbness/tingling/weakness. No coldness or pallor of extremity. No skin tenting. No fevers, chills, SOB, N, V.    Understands and agrees with plan        Objective     R lower extremity:  - SLS intact   - Tender to palpation over R foot and ankle, appropriate   - Compartments soft and compressible  - Wiggles toes beneath splint   - SILT in [place]/Sa/SP/DP/T distribution.  - Foot wwp     Last Recorded Vitals  Blood pressure 107/51, pulse 62, temperature 36.2 °C (97.2 °F), temperature source Temporal, resp. rate 12, height 1.676 m (5' 6\"), weight 80.4 kg (177 lb 4 oz), SpO2 94 %.    Relevant Results  Results for orders placed or performed during the hospital encounter of 10/15/23 (from the past 24 hour(s))   POCT GLUCOSE   Result Value Ref Range    POCT Glucose 143 (H) 74 - 99 mg/dL   SARS-CoV-2 RT PCR   Result Value Ref Range    Coronavirus 2019, PCR Not Detected Not Detected   POCT GLUCOSE   Result Value Ref Range    POCT Glucose 112 (H) 74 - 99 mg/dL   ECG 12 lead   Result Value Ref Range    Ventricular Rate 71 BPM    Atrial Rate 71 BPM    TX Interval 170 ms    QRS Duration 74 ms    QT Interval 422 ms    QTC Calculation(Bazett) 458 ms    P Axis 38 degrees    R Axis 100 degrees    T Axis 7 degrees    QRS Count 12 beats    Q Onset 216 ms    P Onset 131 ms    P Offset 198 ms    T Offset 427 ms    QTC Fredericia 446 ms   POCT GLUCOSE   Result Value Ref Range    POCT Glucose 96 74 - 99 mg/dL   ABO/Rh   Result Value Ref Range    ABO TYPE O     Rh TYPE POS    Prepare Plasma: 1 Units   Result Value Ref Range    PRODUCT CODE F7212P77     Unit Number [id number]     Unit ABO A     " Unit RH POS     Dispense Status XM     Blood Expiration Date October 19, 2023 03:59 EDT     PRODUCT BLOOD TYPE 6200     UNIT VOLUME 196    Prepare RBC: 2 Units   Result Value Ref Range    PRODUCT CODE Y5856B29     Unit Number [id number]     Unit ABO O     Unit RH POS     XM INTEP COMP     Dispense Status TR     Blood Expiration Date November 14, 2023 23:59 EST     PRODUCT BLOOD TYPE 5100     UNIT VOLUME 284     PRODUCT CODE T1334B41     Unit Number [id number]     Unit ABO O     Unit RH POS     XM INTEP COMP     Dispense Status IS     Blood Expiration Date November 14, 2023 23:59 EST     PRODUCT BLOOD TYPE 5100     UNIT VOLUME 283    Prepare Platelets: 1 Units   Result Value Ref Range    PRODUCT CODE A2901K85     Unit Number [id number]     Unit ABO A     Unit RH POS     Dispense Status TR     Blood Expiration Date October 16, 2023 23:59 EDT     PRODUCT BLOOD TYPE 6200     UNIT VOLUME 206    POCT GLUCOSE   Result Value Ref Range    POCT Glucose 99 74 - 99 mg/dL       Assessment/Plan   Principal Problem:    Trimalleolar fracture of ankle, closed, right, initial encounter    R tri mal ankle fx dislocation now s/p R hindfoot nail on 10/16 w Dr. Contreras     Assessment and Plan    Plan:  - Weight-bearing status: NWB RLE in SLS   - No plan to RTOR during this admission   - Infection: Guillermina-operative Ancef, 2 g q8h for 3 doses   - Drains: None   - Embolic ppx: per primary, 6 weeks of ASA 81 mg BID minimum recommendation   - Transfusion: Trend CBC daily, no indication for transfusion  - Dispo pending PT, pain control    Plan was discussed with attending Dr. Ben Bautista MD  Orthopedic Surgery, PGY3  P: [phone] (Doc halo Preferred)    Please page [phone] after 6pm or on weekends for urgent questions.      Jagjit Bautista MD  Orthopedic Surgery, PGY3  P: [phone] (Doc halo Preferred)    Please page [phone] after 6pm or on weekends for urgent questions.

## 2023-10-16 NOTE — PROGRESS NOTES
Jamee Coronel is a 78 y.o. female on day 1 of admission presenting with Trimalleolar fracture of ankle, closed, right, initial encounter.      Dietary Orders (From admission, onward)               NPO Diet; Effective midnight  Diet effective midnight                           Objective     Vitals  Temp:  [36.3 °C (97.3 °F)-36.5 °C (97.7 °F)] 36.4 °C (97.5 °F)  Heart Rate:  [69-81] 69  Resp:  [16-20] 16  BP: (105-118)/(40-50) 105/40       Pain Score:  (sleep)         Peripheral IV 10/15/23 20 G Left;Anterior Forearm (Active)   Number of days: 1       Urethral Catheter Non-latex 16 Fr. (Active)   Number of days: 0       Vent Settings     No intake or output data in the 24 hours ending 10/16/23 0840    Relevant Results              This patient has a urinary catheter   Reason for the urinary catheter remaining today? Urine catheter unnecessary, will be removed today           A/p      Ms. Lidia Krishna is a 78-year-old female with PMHx: T2DM, HTN, cirrhosis with Blake, esophageal varices, A-fib on Eliquis, ESRD, osteomyelitis of the back, anemia of chronic disease, thrombocytopenia.  Admitted for fall down and fracture, work up showed hemoglobin 7.9, platelets 81, x-rays  right trimalleolar fracture Ortho plan to take her to surgery.  Patient to be admitted regular admit for right trimalleolar fracture.     #. Right trimalleolar fracture  #. Fall  #. G. Weakness  -Ortho consult    -Zhang  -Will hold apixaban  -Pain Tylenol scheduled, Oxy 5 every 4 as needed for pain 4-10, Dilaudid every 6 as needed breakthrough pain  -PT OT eval and treat  -Zofran as needed for nausea     #. T2DM  Neuropathy  -Hold NovoLog 70/30  10 units every morning and 8 units pm  -Mild Humalog sliding scale with meals and at bedtime  -Continue home gabapentin 100 mg every morning and 200 nightly     #. ESRD  -Dialysis at [facility] in [facility] Tuesday/Thursday/Saturday  -Continue [place]  -Continue home midodrine 5 mg on dialysis today     #. Anemia  "of chronic disease  #. Thrombocytopenia  #. Liver disease Cirrhosis\"   -Hemoglobin 7.9  -Platelets 81     #. HTN  Stable not on any current medications     #. Osteomyelitis of the spine  -Completed full course of cefepime and Vanc last week  -Voltaren and lido to back     Code Status: Full  DVT ppx:  on hold   Disp: PT/ot         Spoke to my patient, Discussed the medical, social conditions, the reason for this admission explained our plan and recommendations.  Time spent on the assessment of patient, gathering and interpreting data, review of medical record/patient history, personally reviewing radiographic imaging. With greater than 50% spent in personal discussion with patient.  Time >  40 min         Dean Chavarria MD   "

## 2023-10-16 NOTE — PROGRESS NOTES
Orthopaedic Surgery Progress Note    Subjective:    No acute issues. Pain appropriate to injury. Denies N/V. Denies SOB/chest pain. Denies N/T.     Physical Exam    - Constitutional: No acute distress, cooperative  - Eyes: EOM grossly intact  - Head/Neck: Trachea midline  - Respiratory/Thorax: NWOB  - Cardiovascular: RRR on peripheral palpation  - Gastrointestinal: Nondistended  - Psychological: Appropriate mood/behavior  - Skin: Warm and dry. Additional findings in musculoskeletal evaluation  - Musculoskeletal:  ---    Right lower extremity:  - Split cdi  - Compartments soft and compressible  - wiggles toes  - SILT in [place]/Sa/SP/DP/T distributions  - foot wwp     Assessment  Injury: R trimal fx/dx  78F (CKD dialysis, IDDM, recently finished IV abx for ongoing lumbar spine osteo/discitis, L DRF s/p ORIF few years prior, C spine surgery 2 decades prior, chronic radicular pain RLE) mGLF sustaining above. Closed. NVI. Negative MSK secondary. Close reduced in ED. RLE SLS.    Plan: C&P ORIF v exfix R trimal w Dr. Contreras 10/16    Plan:  - Weight-bearing status: NWB RLE SLS  - Feeding: NPO  - Ok to resume DVT ppx POD1    D/w Dr. Contreras    This patient will be followed by the Orthopaedic Trauma service. Please page or Epic Chat the corresponding residents below with questions or concerns.     Ortho Trauma Service (Epic Chat Preferred)  First call: Kobe Gaitan MD PGY2  Second call: Jagjit Bautista MD PGY3    6pm-6am M-F, Holidays, and weekends page Ortho on-call [phone] with urgent questions/concerns.     Kobe Gaitan MD  Orthopaedic Surgery PGY-2  On-call pager [phone]

## 2023-10-16 NOTE — ANESTHESIA POSTPROCEDURE EVALUATION
Patient: Jamee Coronel    Procedure Summary       Date: 10/16/23 Room / Location: [facility] OR 01 / Virtual [facility] OR    Anesthesia Start: 1126 Anesthesia Stop: 1345    Procedure: Hindfoot nail for trimal fx/dx (Right: Ankle) Diagnosis:       Trimalleolar fracture of ankle, closed, right, initial encounter      (Trimalleolar fracture of ankle, closed, right, initial encounter [S82.852V])    Surgeons: Rogelio Contreras MD Responsible Provider: SUKHI Fagan    Anesthesia Type: general ASA Status: 3            Anesthesia Type: general    Vitals Value Taken Time   /60 10/16/23 1430   Temp 36 10/16/23 1442   Pulse 70 10/16/23 1439   Resp 7 10/16/23 1437   SpO2 100 % 10/16/23 1439   Vitals shown include unvalidated device data.    Anesthesia Post Evaluation    Patient location during evaluation: PACU  Patient participation: complete - patient participated  Level of consciousness: responsive to light touch  Pain management: adequate  Airway patency: patent  Cardiovascular status: acceptable  Respiratory status: acceptable        There were no known notable events for this encounter.

## 2023-10-17 ENCOUNTER — APPOINTMENT (OUTPATIENT)
Dept: DIALYSIS | Facility: HOSPITAL | Age: 78
DRG: 492 | End: 2023-10-17
Payer: MEDICARE

## 2023-10-17 LAB
ALBUMIN SERPL BCP-MCNC: 2.8 G/DL (ref 3.4–5)
ANION GAP SERPL CALC-SCNC: 17 MMOL/L (ref 10–20)
BLOOD EXPIRATION DATE: NORMAL
BUN SERPL-MCNC: 45 MG/DL (ref 6–23)
CALCIUM SERPL-MCNC: 7.8 MG/DL (ref 8.6–10.6)
CHLORIDE SERPL-SCNC: 96 MMOL/L (ref 98–107)
CO2 SERPL-SCNC: 27 MMOL/L (ref 21–32)
CREAT SERPL-MCNC: 6.11 MG/DL (ref 0.5–1.05)
DISPENSE STATUS: NORMAL
ERYTHROCYTE [DISTWIDTH] IN BLOOD BY AUTOMATED COUNT: 16 % (ref 11.5–14.5)
GFR SERPL CREATININE-BSD FRML MDRD: 7 ML/MIN/1.73M*2
GLUCOSE BLD MANUAL STRIP-MCNC: 163 MG/DL (ref 74–99)
GLUCOSE BLD MANUAL STRIP-MCNC: 166 MG/DL (ref 74–99)
GLUCOSE BLD MANUAL STRIP-MCNC: 174 MG/DL (ref 74–99)
GLUCOSE BLD MANUAL STRIP-MCNC: 176 MG/DL (ref 74–99)
GLUCOSE BLD MANUAL STRIP-MCNC: 181 MG/DL (ref 74–99)
GLUCOSE SERPL-MCNC: 237 MG/DL (ref 74–99)
HBV SURFACE AB SER-ACNC: 7.3 MIU/ML
HBV SURFACE AG SERPL QL IA: NONREACTIVE
HCT VFR BLD AUTO: 22.5 % (ref 36–46)
HGB BLD-MCNC: 7.5 G/DL (ref 12–16)
MCH RBC QN AUTO: 33.2 PG (ref 26–34)
MCHC RBC AUTO-ENTMCNC: 33.3 G/DL (ref 32–36)
MCV RBC AUTO: 100 FL (ref 80–100)
NRBC BLD-RTO: 0 /100 WBCS (ref 0–0)
PHOSPHATE SERPL-MCNC: 6 MG/DL (ref 2.5–4.9)
PLATELET # BLD AUTO: 97 X10*3/UL (ref 150–450)
PMV BLD AUTO: 10.4 FL (ref 7.5–11.5)
POTASSIUM SERPL-SCNC: 5.1 MMOL/L (ref 3.5–5.3)
PRODUCT BLOOD TYPE: 6200
PRODUCT CODE: NORMAL
RBC # BLD AUTO: 2.26 X10*6/UL (ref 4–5.2)
SODIUM SERPL-SCNC: 135 MMOL/L (ref 136–145)
UNIT ABO: NORMAL
UNIT NUMBER: NORMAL
UNIT RH: NORMAL
UNIT VOLUME: 196
WBC # BLD AUTO: 6.7 X10*3/UL (ref 4.4–11.3)

## 2023-10-17 PROCEDURE — 8010000001 HC DIALYSIS - HEMODIALYSIS PER DAY

## 2023-10-17 PROCEDURE — 97530 THERAPEUTIC ACTIVITIES: CPT | Mod: GO | Performed by: OCCUPATIONAL THERAPIST

## 2023-10-17 PROCEDURE — 80069 RENAL FUNCTION PANEL: CPT | Mod: CMCLAB | Performed by: NURSE PRACTITIONER

## 2023-10-17 PROCEDURE — 2500000002 HC RX 250 W HCPCS SELF ADMINISTERED DRUGS (ALT 637 FOR MEDICARE OP, ALT 636 FOR OP/ED): Performed by: NURSE PRACTITIONER

## 2023-10-17 PROCEDURE — 36415 COLL VENOUS BLD VENIPUNCTURE: CPT | Performed by: INTERNAL MEDICINE

## 2023-10-17 PROCEDURE — 82947 ASSAY GLUCOSE BLOOD QUANT: CPT

## 2023-10-17 PROCEDURE — 2060000001 HC INTERMEDIATE ICU ROOM DAILY

## 2023-10-17 PROCEDURE — 97162 PT EVAL MOD COMPLEX 30 MIN: CPT | Mod: GP

## 2023-10-17 PROCEDURE — 96372 THER/PROPH/DIAG INJ SC/IM: CPT | Performed by: NURSE PRACTITIONER

## 2023-10-17 PROCEDURE — 36415 COLL VENOUS BLD VENIPUNCTURE: CPT | Mod: CMCLAB | Performed by: INTERNAL MEDICINE

## 2023-10-17 PROCEDURE — 86706 HEP B SURFACE ANTIBODY: CPT | Mod: CMCLAB | Performed by: INTERNAL MEDICINE

## 2023-10-17 PROCEDURE — 99221 1ST HOSP IP/OBS SF/LOW 40: CPT | Performed by: INTERNAL MEDICINE

## 2023-10-17 PROCEDURE — 36415 COLL VENOUS BLD VENIPUNCTURE: CPT | Performed by: NURSE PRACTITIONER

## 2023-10-17 PROCEDURE — 97165 OT EVAL LOW COMPLEX 30 MIN: CPT | Mod: GO | Performed by: OCCUPATIONAL THERAPIST

## 2023-10-17 PROCEDURE — 87340 HEPATITIS B SURFACE AG IA: CPT | Mod: CMCLAB | Performed by: INTERNAL MEDICINE

## 2023-10-17 PROCEDURE — 97530 THERAPEUTIC ACTIVITIES: CPT | Mod: GP

## 2023-10-17 PROCEDURE — 99232 SBSQ HOSP IP/OBS MODERATE 35: CPT | Performed by: INTERNAL MEDICINE

## 2023-10-17 PROCEDURE — 90935 HEMODIALYSIS ONE EVALUATION: CPT | Performed by: INTERNAL MEDICINE

## 2023-10-17 PROCEDURE — 85027 COMPLETE CBC AUTOMATED: CPT | Mod: CMCLAB | Performed by: INTERNAL MEDICINE

## 2023-10-17 PROCEDURE — 2500000001 HC RX 250 WO HCPCS SELF ADMINISTERED DRUGS (ALT 637 FOR MEDICARE OP): Performed by: NURSE PRACTITIONER

## 2023-10-17 RX ORDER — OXYCODONE AND ACETAMINOPHEN 5; 325 MG/1; MG/1
1 TABLET ORAL EVERY 12 HOURS PRN
Status: CANCELLED | OUTPATIENT
Start: 2023-10-17

## 2023-10-17 RX ORDER — OXYCODONE AND ACETAMINOPHEN 5; 325 MG/1; MG/1
.5-1 TABLET ORAL EVERY 12 HOURS PRN
COMMUNITY
End: 2023-10-20 | Stop reason: HOSPADM

## 2023-10-17 RX ORDER — DIAZEPAM 10 MG/1
5 TABLET ORAL AS NEEDED
Status: ON HOLD | COMMUNITY
End: 2023-10-17

## 2023-10-17 RX ORDER — ACETAMINOPHEN 500 MG
500 TABLET ORAL EVERY 8 HOURS PRN
COMMUNITY
End: 2023-10-20 | Stop reason: DRUGHIGH

## 2023-10-17 RX ORDER — ACETAMINOPHEN 500 MG
500 TABLET ORAL EVERY 8 HOURS PRN
Status: CANCELLED | OUTPATIENT
Start: 2023-10-17

## 2023-10-17 RX ADMIN — OXYCODONE HYDROCHLORIDE 5 MG: 5 TABLET ORAL at 12:01

## 2023-10-17 RX ADMIN — NYSTATIN 1 APPLICATION: 100000 POWDER TOPICAL at 17:12

## 2023-10-17 RX ADMIN — ACETAMINOPHEN 975 MG: 325 TABLET ORAL at 17:12

## 2023-10-17 RX ADMIN — MIDODRINE HYDROCHLORIDE 5 MG: 5 TABLET ORAL at 08:55

## 2023-10-17 RX ADMIN — INSULIN LISPRO 1 UNITS: 100 INJECTION, SOLUTION INTRAVENOUS; SUBCUTANEOUS at 08:50

## 2023-10-17 RX ADMIN — INSULIN LISPRO 1 UNITS: 100 INJECTION, SOLUTION INTRAVENOUS; SUBCUTANEOUS at 17:14

## 2023-10-17 RX ADMIN — SENNOSIDES AND DOCUSATE SODIUM 2 TABLET: 50; 8.6 TABLET ORAL at 20:41

## 2023-10-17 RX ADMIN — GABAPENTIN 200 MG: 100 CAPSULE ORAL at 20:43

## 2023-10-17 RX ADMIN — ACETAMINOPHEN 975 MG: 325 TABLET ORAL at 00:39

## 2023-10-17 RX ADMIN — NYSTATIN 1 APPLICATION: 100000 POWDER TOPICAL at 20:41

## 2023-10-17 RX ADMIN — GABAPENTIN 100 MG: 100 CAPSULE ORAL at 08:55

## 2023-10-17 RX ADMIN — ASCORBIC ACID, THIAMINE MONONITRATE,RIBOFLAVIN, NIACINAMIDE, PYRIDOXINE HYDROCHLORIDE, FOLIC ACID, CYANOCOBALAMIN, BIOTIN, CALCIUM PANTOTHENATE, 1 CAPSULE: 100; 1.5; 1.7; 20; 10; 1; 6000; 150000; 5 CAPSULE, LIQUID FILLED ORAL at 06:41

## 2023-10-17 RX ADMIN — ACETAMINOPHEN 975 MG: 325 TABLET ORAL at 08:55

## 2023-10-17 ASSESSMENT — COGNITIVE AND FUNCTIONAL STATUS - GENERAL
TURNING FROM BACK TO SIDE WHILE IN FLAT BAD: A LITTLE
DRESSING REGULAR UPPER BODY CLOTHING: A LITTLE
TURNING FROM BACK TO SIDE WHILE IN FLAT BAD: A LITTLE
HELP NEEDED FOR BATHING: A LOT
CLIMB 3 TO 5 STEPS WITH RAILING: TOTAL
MOVING FROM LYING ON BACK TO SITTING ON SIDE OF FLAT BED WITH BEDRAILS: A LITTLE
STANDING UP FROM CHAIR USING ARMS: TOTAL
TOILETING: TOTAL
WALKING IN HOSPITAL ROOM: TOTAL
WALKING IN HOSPITAL ROOM: TOTAL
DAILY ACTIVITIY SCORE: 14
TOILETING: TOTAL
MOVING TO AND FROM BED TO CHAIR: TOTAL
PERSONAL GROOMING: A LITTLE
DRESSING REGULAR LOWER BODY CLOTHING: TOTAL
MOBILITY SCORE: 10
HELP NEEDED FOR BATHING: A LOT
PERSONAL GROOMING: A LITTLE
STANDING UP FROM CHAIR USING ARMS: TOTAL
CLIMB 3 TO 5 STEPS WITH RAILING: TOTAL
MOVING FROM LYING ON BACK TO SITTING ON SIDE OF FLAT BED WITH BEDRAILS: A LITTLE
DRESSING REGULAR LOWER BODY CLOTHING: TOTAL
MOVING TO AND FROM BED TO CHAIR: TOTAL
DRESSING REGULAR UPPER BODY CLOTHING: A LITTLE
DAILY ACTIVITIY SCORE: 14
MOBILITY SCORE: 10

## 2023-10-17 ASSESSMENT — PAIN SCALES - GENERAL
PAINLEVEL_OUTOF10: 4
PAINLEVEL_OUTOF10: 4
PAINLEVEL_OUTOF10: 0 - NO PAIN
PAINLEVEL_OUTOF10: 0 - NO PAIN
PAINLEVEL_OUTOF10: 5 - MODERATE PAIN
PAINLEVEL_OUTOF10: 5 - MODERATE PAIN
PAINLEVEL_OUTOF10: 6

## 2023-10-17 ASSESSMENT — ACTIVITIES OF DAILY LIVING (ADL)
ADL_ASSISTANCE: NEEDS ASSISTANCE
FEEDING: APPROPRIATE FOR AGE
BATHING_ASSISTANCE: MODERATE
TOILETING_ASSISTANCE: MODERATE
GROOMING_ASSISTANCE: STAND BY
ADL_ASSISTANCE: NEEDS ASSISTANCE

## 2023-10-17 ASSESSMENT — PAIN - FUNCTIONAL ASSESSMENT
PAIN_FUNCTIONAL_ASSESSMENT: 0-10

## 2023-10-17 ASSESSMENT — PAIN DESCRIPTION - DESCRIPTORS: DESCRIPTORS: ACHING

## 2023-10-17 NOTE — NURSING NOTE
.Report from Sending RN:    Report From: RAHUL Mccarthy  Recent Surgery of Procedure: No  Baseline Level of Consciousness (LOC): A&OX3  Oxygen Use: Yes  Type: NC 1L  Diabetic: No  Last BP Med Given Day of Dialysis: MIDODRINE  Last Pain Med Given: Tylenol  Lab Tests to be Obtained with Dialysis: Yes  Blood Transfusion to be Given During Dialysis: No  Available IV Access: Yes  Medications to be Administered During Dialysis: No  Continuous IV Infusion Running: NONE  Restraints on Currently or in the Last 24 Hours: No  Hand-Off Communication: Pt had no acute event overnight. Pt had morning medication, not on precaution. AVG for HD.

## 2023-10-17 NOTE — PROGRESS NOTES
Physical Therapy                 Therapy Communication Note    Patient Name: Jamee Coronel  MRN: [id number]  Today's Date: 10/17/2023     Discipline: Physical Therapy    Missed Visit Reason: Missed Visit Reason: Other (Comment)    Missed Time: Attempt    Comment:  Patient off floor at dialysis. Will reattempt as appropriate and as schedule permits.

## 2023-10-17 NOTE — CARE PLAN
Problem: Fall/Injury  Goal: Not fall by end of shift  10/16/2023 2259 by Vadim Adamson RN  Outcome: Progressing  10/16/2023 2258 by Vadim Adamson RN  Outcome: Progressing  Goal: Be free from injury by end of the shift  10/16/2023 2259 by Vadim Adamson RN  Outcome: Progressing  10/16/2023 2258 by Vadim Adamson RN  Outcome: Progressing  Goal: Verbalize understanding of personal risk factors for fall in the hospital  10/16/2023 2259 by Vadim Adamson RN  Outcome: Progressing  10/16/2023 2258 by Vadim Adamson RN  Outcome: Progressing  Goal: Verbalize understanding of risk factor reduction measures to prevent injury from fall in the home  10/16/2023 2259 by Vadim Adamson RN  Outcome: Progressing  10/16/2023 2258 by Vadim Adamson RN  Outcome: Progressing  Goal: Use assistive devices by end of the shift  10/16/2023 2259 by Vadim Adamson RN  Outcome: Progressing  10/16/2023 2258 by Vadim Adamson RN  Outcome: Progressing  Goal: Pace activities to prevent fatigue by end of the shift  10/16/2023 2259 by Vadim Adamson RN  Outcome: Progressing  10/16/2023 2258 by Vadim Adamson RN  Outcome: Progressing   The patient's goals for the shift include have minimal pain    The clinical goals for the shift include decrease pain

## 2023-10-17 NOTE — PROGRESS NOTES
Occupational Therapy    Evaluation    Patient Name: Jamee Coronel  MRN: [id number]  Today's Date: 10/17/2023  Time Calculation  Start Time: 1413  Stop Time: 1444  Time Calculation (min): 31 min    Assessment  IP OT Assessment  OT Assessment: Impaired ADL performance  Prognosis: Good  Evaluation/Treatment Tolerance: Patient limited by fatigue, Patient limited by pain  Medical Staff Made Aware: Yes  End of Session Communication: Bedside nurse  End of Session Patient Position: Bed, 3 rail up, Alarm on  Plan:  Treatment Interventions: ADL retraining, Functional transfer training, UE strengthening/ROM, Endurance training, Patient/family training  OT Frequency: 3 times per week  OT Discharge Recommendations: Moderate intensity level of continued care    Subjective   Current Problem:  1. Trimalleolar fracture of ankle, closed, right, initial encounter  Case Request Operating Room: Hindfoot nail for trimal fx/dx    Case Request Operating Room: Hindfoot nail for trimal fx/dx        General:  General  Reason for Referral: OT for ADL and Safety Assessment (fall at home s/p Trimalleolar fracture of ankle, closed, right, initial encounter, now s/p R  hind foot nail for tri mal ankle fx on 10/16  with Dr. Contreras)  Referred By: Kobe Gaitan MD  Past Medical History Relevant to Rehab: T2DM, HTN, cirrhosis with Blake, esophageal varices, A-fib on Eliquis, ESRD, osteomyelitis of the back, anemia of chronic disease, thrombocytopenia  Missed Visit: Yes  Missed Visit Reason: Other (Comment) (pt at dialysis and unavailable for follow up OT treatment this AM)  Family/Caregiver Present: No  Prior to Session Communication: Bedside nurse  Patient Position Received: Bed, 3 rail up, Alarm on  Preferred Learning Style: verbal, visual  General Comment: RN cleared pt for therapy evalution; pt required moderate encouragement having just returned from dialysis; purewick; 1L O2 via NC  Precautions:  LE Weight Bearing Status: Right Non-Weight  Bearing  Medical Precautions: Fall precautions  Precautions Comment: O2  Vital Signs:     Pain:  Pain Assessment  Pain Assessment: 0-10  Pain Score: 5 - Moderate pain  Pain Type: Surgical pain, Acute pain  Pain Location: Ankle  Pain Orientation: Right    Objective   Cognition:  Overall Cognitive Status: Within Functional Limits  Problem Solving: Exceptions to WFL (Pt initally trying to use call light for phone; room phone identified for pt and required extended time to realize use of call light and phone.  Pt required assistance to order a meal.)        Home Living:  Type of Home: House  Lives With: Siblings (sisters)  Home Adaptive Equipment:  (rollator)  Home Layout: One level  Home Access: Stairs to enter with rails  Entrance Stairs-Rails: Both  Entrance Stairs-Number of Steps: 4  Bathroom Shower/Tub: Tub/shower unit  Bathroom Toilet: Standard  Bathroom Equipment: Grab bars in shower, Tub transfer bench  Bathroom Accessibility: no concers     Prior Function:  Level of Kenn[place]: Needs assistance with functional transfers, Needs assistance with homemaking, Needs assistance with ADLs (Prior to osteomyelitis in back; pt I with mobility and use of rollator; progressive weakness reduced independence in all areas and increased use of wheelchair and need for assistance)  Receives Help From: Family  ADL Assistance: Needs assistance  Bath: Moderate  Toileting: Moderate  Dressing: Moderate  Grooming: Stand by  Feeding: Appropriate for age  Homemaking Assistance: Needs assistance  Meal Prep: Moderate  Laundry: Maximal  Vacuuming: Total  Cleaning: Total  Ambulatory Assistance: Needs assistance  Transfers: Moderate     Bed Mobility/Transfers: Bed Mobility  Bed Mobility: Yes  Bed Mobility 1  Bed Mobility 1: Supine to sitting, Sitting to supine  Level of Assistance 1: Contact guard  Bed Mobility Comments 1: patient required mod A to boost in bed. Trendelenburg bed and pt able to use LLE to assist in boosting.  Bed Mobility  2  Bed Mobility  2: Rolling right  Level of Assistance 2: Minimal verbal cues, Contact guard   and Transfers  Transfer: Yes  Transfer 1  Technique 1: Sit to stand  Transfer Device 1: Walker  Transfer Level of Assistance 1: Moderate verbal cues, Dependent  Trials/Comments 1: attempted 2 stands from EOB and unable, pt with difficulty maintaining NWB RLE and decreased active attempt to stand  Modalities:     IADL's:      Vision: Vision - Basic Assessment  Current Vision: Wears glasses all the time  Sensation:  Light Touch: No apparent deficits  Strength:  Strength Comments: Generalized weakness throughout BUE  Perception:     Coordination:      Hand Function:  Hand Function  Gross Grasp: Functional  Coordination: Functional  Extremities: RUE   RUE : Within Functional Limits and LUE   LUE: Within Functional Limits      Outcome Measures: [facility] Daily Activity  Putting on and taking off regular lower body clothing: Total  Bathing (including washing, rinsing, drying): A lot  Putting on and taking off regular upper body clothing: A little  Toileting, which includes using toilet, bedpan or urinal: Total  Taking care of personal grooming such as brushing teeth: A little  Eating Meals: None  Daily Activity - Total Score: 14      Education Documentation  Precautions, taught by Tiffanie Lentz OT at 10/17/2023  4:02 PM.  Learner: Patient  Readiness: Acceptance  Method: Explanation  Response: Verbalizes Understanding, Needs Reinforcement    ADL Training, taught by Tiffanie Lentz OT at 10/17/2023  4:02 PM.  Learner: Patient  Readiness: Acceptance  Method: Explanation  Response: Verbalizes Understanding, Needs Reinforcement    Education Comments  No comments found.      Goals:   Encounter Problems       Encounter Problems (Active)       Balance       STG - Maintains static standing balance with upper extremity support (Progressing)       Start:  10/17/23    Expected End:  10/31/23       INTERVENTIONS:  1. Practice standing with minimal  support.  2. Educate patient about standing tolerance.  3. Educate patient about independence with gait, transfers, and ADL's.  4. Educate patient about use of assistive device.  5. Educate patient about self-directed care.         STG - Maintains dynamic sitting balance without upper extremity support (Progressing)       Start:  10/17/23    Expected End:  10/31/23       INTERVENTIONS:  1. Practice sitting on the edge of a bed/mat with minimal support.  2. Educate patient about maintining total hip precautions while maintaining balance.  3. Educate patient about pressure relief.  4. Educate patient about use of assistive device.            Dressing Upper Extremities       STG - Patient will dress upper body (Progressing)       Start:  10/17/23    Expected End:  10/31/23               Grooming       STG - Patient completes grooming (Progressing)       Start:  10/17/23    Expected End:  10/31/23               Transfers       STG - Patient will perform toilet transfer (Progressing)       Start:  10/17/23    Expected End:  10/31/23            STG - Patient maintains weight bearing status during transfers (Progressing)       Start:  10/17/23    Expected End:  10/31/23

## 2023-10-17 NOTE — PROGRESS NOTES
"ORTHOPAEDIC SURGERY PROGRESS NOTE      Jamee Coronel is a 78 y.o. female on day 2 of admission presenting with Trimalleolar fracture of ankle, closed, right, initial encounter, now s/p R  hind foot nail for tri mal ankle fx on 10/16  with Dr. Ben Tam   Seen and examined now on RNF. NAEO. AFVSS. NAD, pain well controlled. No numbness/tingling/weakness. No coldness or pallor of extremity. No fevers, chills, SOB, N, V.    Understands and agrees with plan        Objective     R lower extremity:  - SLS intact   - Tender to palpation over R foot and ankle, appropriate   - Compartments soft and compressible  - Wiggles toes beneath splint   - SILT in [place]/Sa/SP/DP/T distribution.  - Foot wwp     Last Recorded Vitals  Blood pressure (!) 113/44, pulse 66, temperature 36.5 °C (97.7 °F), resp. rate 18, height 1.676 m (5' 6\"), weight 80.4 kg (177 lb 4 oz), SpO2 97 %.    Relevant Results  Results for orders placed or performed during the hospital encounter of 10/15/23 (from the past 24 hour(s))   POCT GLUCOSE   Result Value Ref Range    POCT Glucose 96 74 - 99 mg/dL   ABO/Rh   Result Value Ref Range    ABO TYPE O     Rh TYPE POS    Prepare Plasma: 1 Units   Result Value Ref Range    PRODUCT CODE N1219L28     Unit Number [id number]     Unit ABO A     Unit RH POS     Dispense Status XM     Blood Expiration Date October 19, 2023 03:59 EDT     PRODUCT BLOOD TYPE 6200     UNIT VOLUME 196    Prepare Platelets: 1 Units   Result Value Ref Range    PRODUCT CODE T0431M76     Unit Number [id number]     Unit ABO A     Unit RH POS     Dispense Status TR     Blood Expiration Date October 16, 2023 23:59 EDT     PRODUCT BLOOD TYPE 6200     UNIT VOLUME 206    POCT GLUCOSE   Result Value Ref Range    POCT Glucose 99 74 - 99 mg/dL   Prepare RBC: 2 Units   Result Value Ref Range    PRODUCT CODE W7154A34     Unit Number [id number]     Unit ABO O     Unit RH POS     XM INTEP COMP     Dispense Status TR     Blood " Expiration Date November 14, 2023 23:59 EST     PRODUCT BLOOD TYPE 5100     UNIT VOLUME 284     PRODUCT CODE C2950M25     Unit Number [id number]     Unit ABO O     Unit RH POS     XM INTEP COMP     Dispense Status XM     Blood Expiration Date November 14, 2023 23:59 EST     PRODUCT BLOOD TYPE 5100     UNIT VOLUME 283    POCT GLUCOSE   Result Value Ref Range    POCT Glucose 128 (H) 74 - 99 mg/dL   POCT GLUCOSE   Result Value Ref Range    POCT Glucose 143 (H) 74 - 99 mg/dL   POCT GLUCOSE   Result Value Ref Range    POCT Glucose 181 (H) 74 - 99 mg/dL       Assessment/Plan   Principal Problem:    Trimalleolar fracture of ankle, closed, right, initial encounter    R tri mal ankle fx dislocation now s/p R hindfoot nail on 10/16 w Dr. Contreras     Assessment and Plan    Plan:  - Weight-bearing status: NWB RLE in SLS   - No plan to RTOR during this admission   - Infection: Guillermina-operative Ancef, 2 g q8h for 3 doses   - Drains: None   - Embolic ppx: per primary, 6 weeks of ASA 81 mg BID minimum recommendation   - Transfusion: Trend CBC daily, no indication for transfusion  - PT/OT    Orthopaedics will follow peripherally while patient is in house. Recommend:  - Pt will need to be NWB RLE until first follow up appointment.   - Patient can be continued on normal anticoagulation regimen from an orthopedics perspective, recommend at least 4 weeks  - Dressing can be removed in 7 days. Can shower after that.  - We recommend discharge with calcium(as carbonate)-VitD 600mg-400IU (10mcg) BID x30d  - Patient should follow up w/ Dr. Contreras in 3 weeks after discharge for post-operative appointment (patient may call [phone] to schedule).     Please page with questions    This patient will be followed by the Orthopaedic Trauma service. Please page or Epic Chat the corresponding residents below with questions or concerns.     Ortho Trauma Service (Epic Chat Preferred)  First call: Kobe Gaitan MD PGY2  Second call: Jagjit Bautista MD  PGY3    6pm-6am M-F, Holidays, and weekends page Ortho on-call @[phone] with urgent questions/concerns.     Kobe Gaitan MD  Orthopaedic Surgery PGY-2  On-call pager [phone]

## 2023-10-17 NOTE — CARE PLAN
The patient's goals for the shift include have minimal pain    The clinical goals for the shift include decrease pain    Problem: Fall/Injury  Goal: Not fall by end of shift  Outcome: Progressing  Goal: Be free from injury by end of the shift  Outcome: Progressing  Goal: Verbalize understanding of personal risk factors for fall in the hospital  Outcome: Progressing  Goal: Verbalize understanding of risk factor reduction measures to prevent injury from fall in the home  Outcome: Progressing  Goal: Use assistive devices by end of the shift  Outcome: Progressing  Goal: Pace activities to prevent fatigue by end of the shift  Outcome: Progressing

## 2023-10-17 NOTE — PROGRESS NOTES
Occupational Therapy                 Therapy Communication Note    Patient Name: Jamee Coronel  MRN: [id number]  Today's Date: 10/17/2023     Discipline: Occupational Therapy    Missed Visit Reason: Missed Visit Reason: Other (Comment) (pt at dialysis and unavailable for follow up OT treatment this AM)    Missed Time: Attempt    Comment: Pt unavailable for follow up OT treatment session this date. Pt receiving dialysis.

## 2023-10-17 NOTE — PROGRESS NOTES
"Physical Therapy    Physical Therapy Evaluation & Treatment    Patient Name: Jamee Coronel  MRN: [id number]  Today's Date: 10/17/2023   Time Calculation  Start Time: 1414  Stop Time: 1444  Time Calculation (min): 30 min    Assessment/Plan   PT Assessment  PT Assessment Results: Decreased strength, Decreased range of motion, Decreased endurance, Impaired balance, Decreased mobility, Decreased coordination, Impaired judgement, Decreased safety awareness  Rehab Prognosis: Good  Evaluation/Treatment Tolerance: Patient limited by fatigue  Medical Staff Made Aware: Yes  End of Session Communication: Bedside nurse  End of Session Patient Position: Bed, 3 rail up, Alarm on  IP OR SWING BED PT PLAN  Inpatient or Swing Bed: Inpatient  PT Plan  Treatment/Interventions: Bed mobility, Transfer training, Gait training, Stair training, Balance training, Strengthening, Endurance training, Range of motion, Therapeutic exercise, Therapeutic activity  PT Plan: Skilled PT  PT Frequency: 4 times per week  PT Discharge Recommendations: Moderate intensity level of continued care  PT Recommended Transfer Status: Assist x2  PT - OK to Discharge: Yes (indicates PT eval complete and dc rec determined)      Subjective     General Visit Information:  General  Reason for Referral: fall at home s/p Trimalleolar fracture of ankle, closed, right, initial encounter, now s/p R  hind foot nail for tri mal ankle fx on 10/16  with Dr. Contreras  Past Medical History Relevant to Rehab: T2DM, HTN, cirrhosis with Blake, esophageal varices, A-fib on Eliquis, ESRD, osteomyelitis of the back, anemia of chronic disease, thrombocytopenia  Missed Visit: Yes  Missed Visit Reason: Other (Comment)  Prior to Session Communication: Bedside nurse  Patient Position Received: Bed, 3 rail up, Alarm on  General Comment: pt supine in bed, asking how to use her \"phone\" (call light) to order dinner for later. explained to pt actual room phone to order and assisted with " placing order to be delivered around 6pm. RN cleared for therapy and pt agreeable and cooperative, moderate encouragement at times  Home Living:  Home Living  Type of Home: House  Lives With: Siblings (brother and sister)  Home Adaptive Equipment:  (rollator)  Home Layout: One level  Home Access: Stairs to enter with rails  Entrance Stairs-Rails: Both  Entrance Stairs-Number of Steps: 4  Bathroom Shower/Tub: Tub/shower unit  Bathroom Equipment: Grab bars in shower, Tub transfer bench  Prior Level of Function:  Prior Function Per Pt/Caregiver Report  Level of Industry:  (pt reports she requires assist with ADL's since her OM in her back; prior to infection pt was indep using rollator to ambulate. now she reports she is weak and using a wheelchair, transfers from bed > chair by scooting and has not stood.)  Receives Help From: Family  ADL Assistance: Needs assistance (reports her sister now assists with dressing and bathing)  Homemaking Assistance: Needs assistance  Ambulatory Assistance:  (was previously using rollator, but now has not ambulating since infection and using wheelchair.)  Precautions:  Precautions  LE Weight Bearing Status: Right Non-Weight Bearing  Medical Precautions: Fall precautions  Vital Signs:       Objective   Pain:  Pain Assessment  Pain Assessment: 0-10  Pain Score: 5 - Moderate pain  Pain Type: Surgical pain, Acute pain  Pain Location: Ankle  Pain Orientation: Right  Cognition:  Cognition  Overall Cognitive Status: Within Functional Limits (appears questionable)    General Assessments:        Activity Tolerance  Endurance: Tolerates 10 - 20 min exercise with multiple rests    Sensation  Light Touch: No apparent deficits    Static Sitting Balance  Static Sitting-Level of Assistance: Close supervision  Functional Assessments:  Bed Mobility  Bed Mobility: Yes  Bed Mobility 1  Bed Mobility 1: Supine to sitting, Sitting to supine  Level of Assistance 1: Contact guard  Bed Mobility Comments 1:  patient required mod A to boost in bed. Trendelenburg bed and pt able to use LLE to assist in boosting.      Transfers  Transfer: Yes  Transfer 1  Transfer From 1: Sit to  Transfer to 1: Stand  Technique 1: Sit to stand  Transfer Device 1: Walker  Transfer Level of Assistance 1: Dependent (x2)  Trials/Comments 1: attempted 2 stands from EOB and unable, pt with difficulty maintaining NWB RLE and decreased active attempt to stand       Extremity/Trunk Assessments:  RLE   RLE :  (WFL except ankle due to SLS)  LLE   LLE : Within Functional Limits  Treatments:  Therapeutic Activity  Therapeutic Activity Performed: Yes  Therapeutic Activity 1: patient sat EOB for ~ 10 minutes during session. transfers attempted and unable. Patient then scooted along EOB with mod A, increased time to complete and rest breaks.    Bed Mobility 2  Bed Mobility  2: Rolling right  Level of Assistance 2: Contact guard  Outcome Measures:  [facility] Basic Mobility  Turning from your back to your side while in a flat bed without using bedrails: A little  Moving from lying on your back to sitting on the side of a flat bed without using bedrails: A little  Moving to and from bed to chair (including a wheelchair): Total  Standing up from a chair using your arms (e.g. wheelchair or bedside chair): Total  To walk in hospital room: Total  Climbing 3-5 steps with railing: Total  Basic Mobility - Total Score: 10    Encounter Problems       Encounter Problems (Active)       Mobility       STG - Patient will ambulate 10' with FWW and maintain NWB RLE  (Progressing)       Start:  10/17/23    Expected End:  11/07/23       May progress distance as appropriate             Transfers       STG - Patient will perform bed mobility indep  (Progressing)       Start:  10/17/23    Expected End:  11/07/23            STG - Patient will transfer sit to and from stand min A with LRAD and maintain NWB RLE  (Progressing)       Start:  10/17/23    Expected End:  11/07/23                    Education Documentation  Precautions, taught by Mayra Rosen PT at 10/17/2023  3:09 PM.  Learner: Patient  Readiness: Acceptance  Method: Explanation  Response: Verbalizes Understanding  Comment: flores pt on role of PT, and NWB RLE with proper technique for transfers    Mobility Training, taught by Mayra Rosen PT at 10/17/2023  3:09 PM.  Learner: Patient  Readiness: Acceptance  Method: Explanation  Response: Verbalizes Understanding  Comment: flores pt on role of PT, and NWB RLE with proper technique for transfers    Education Comments  No comments found.

## 2023-10-17 NOTE — PROGRESS NOTES
Jamee Coronel is a 78 y.o. female on day 2 of admission presenting with Trimalleolar fracture of ankle, closed, right, initial encounter.      Dietary Orders (From admission, onward)               Adult diet Carb Controlled; 75 gram carb/meal, 45 gram Carb evening snack  Diet effective now        Question Answer Comment   Diet type Carb Controlled    Carb diet selection: 75 gram carb/meal, 45 gram Carb evening snack                          Objective     Vitals  Temp:  [36 °C (96.8 °F)-36.6 °C (97.9 °F)] 36.5 °C (97.7 °F)  Heart Rate:  [62-74] 67  Resp:  [12-19] 18  BP: ()/(40-60) 88/46       Pain Score: 0 - No pain         Peripheral IV 10/15/23 20 G Left;Anterior Forearm (Active)   Number of days: 1       Urethral Catheter Non-latex 16 Fr. (Active)   Number of days: 0       Vent Settings  PEEP/CPAP (cm H2O):  [0 cm H20-5 cm H20] 5 cm H20    Intake/Output Summary (Last 24 hours) at 10/17/2023 0804  Last data filed at 10/16/2023 1342  Gross per 24 hour   Intake 1090 ml   Output 75 ml   Net 1015 ml       Relevant Results              This patient has a urinary catheter   Reason for the urinary catheter remaining today? Urine catheter unnecessary, will be removed today           A/p      Ms. Lidia Krishna is a 78-year-old female with PMHx: T2DM, HTN, cirrhosis with Blake, esophageal varices, A-fib on Eliquis, ESRD, osteomyelitis of the back, anemia of chronic disease, thrombocytopenia.  Admitted for fall down and fracture, work up showed hemoglobin 7.9, platelets 81, x-rays  right trimalleolar fracture Ortho plan to take her to surgery 10/16.  Patient to be admitted regular admit for right trimalleolar fracture.     #. Right trimalleolar fracture s/p surgery 10/16.  #. Fall  #. G. Weakness  -Ortho consult    -Zhang  -Will hold apixaban  -Pain Tylenol scheduled, Oxy 5 every 4 as needed for pain 4-10, Dilaudid every 6 as needed breakthrough pain  -PT OT eval and treat  -Zofran as needed for nausea   Per ortho:    "Orthopaedics will follow peripherally while patient is in house. Recommend:  - Pt will need to be NWB RLE until first follow up appointment.   - Patient can be continued on normal anticoagulation regimen from an orthopedics perspective, recommend at least 4 weeks  - Dressing can be removed in 7 days. Can shower after that.  - We recommend discharge with calcium(as carbonate)-VitD 600mg-400IU (10mcg) BID x30d  - Patient should follow up w/ Dr. Contreras in 3 weeks after discharge for post-operative appointment (patient may call [phone] to schedule).   #. T2DM  Neuropathy  -Hold NovoLog 70/30  10 units every morning and 8 units pm  -Mild Humalog sliding scale with meals and at bedtime  -Continue home gabapentin 100 mg every morning and 200 nightly     #. ESRD  -Dialysis at [facility] in [facility] Tuesday/Thursday/Saturday  -Continue Robert-Isidro  -Continue home midodrine 5 mg on dialysis today     #. Anemia of chronic disease  #. Thrombocytopenia  #. Liver disease Cirrhosis\"   -Hemoglobin 7.9  -Platelets 81     #. HTN  Stable not on any current medications     #. Osteomyelitis of the spine  -Completed full course of cefepime and Vanc last week  -Voltaren and lido to back     Code Status: Full  DVT ppx:  on hold   Disp: PT/ot         Spoke to my patient, Discussed the medical, social conditions, the reason for this admission explained our plan and recommendations.  Time spent on the assessment of patient, gathering and interpreting data, review of medical record/patient history, personally reviewing radiographic imaging. With greater than 50% spent in personal discussion with patient.  Time >  40 min         Dean Chavarria MD   "

## 2023-10-17 NOTE — NURSING NOTE
Report to Receiving RN:    Report From: This nurse called HH3, nurse did not  call.   Time Report Called: 1327  Hand-Off Communication: Pt was quiet during tx. She did say she was having pain, approx 6 so given 5mg Oxy. Pain is now a 4. BP post tx 128/51, P 73  Complications During Treatment: No  Ultrafiltration Treatment: Yes, 0.5L  Medications Administered During Dialysis: Yes, Oxy, 5mg  Blood Products Administered During Dialysis: No  Labs Sent During Dialysis: Yes  Heparin Drip Rate Changes: N/A        Last Updated: 1:26 PM by STEVO RAMSEY

## 2023-10-17 NOTE — PROGRESS NOTES
Pharmacy Medication History Review    Jamee Coronel is a 78 y.o. female admitted for Trimalleolar fracture of ankle, closed, right, initial encounter. Pharmacy reviewed the patient's [id number] medications and allergies for accuracy.    The list below reflects the updated PTA list. Please review each medication in order reconciliation for additional clarification and justification.  Medications Prior to Admission   Medication Sig Dispense Refill Last Dose    apixaban (Eliquis) 2.5 mg tablet Take 1 tablet (2.5 mg) by mouth twice a day.       B complex-vitamin C-folic acid (Robert Caps) 1 mg capsule Take 1 capsule by mouth once daily.       gabapentin (Neurontin) 100 mg capsule Take 2 capsules (200 mg) by mouth once daily at bedtime.       lidocaine (Lidoderm) 5 % patch Place 1 patch on the skin once daily.       meclizine (Antivert) 25 mg tablet Take 0.5 tablets (12.5 mg) by mouth 3 times a day as needed for dizziness.       methocarbamol (Robaxin) 500 mg tablet Take 1 tablet (500 mg) by mouth every 8 hours if needed.       midodrine (Proamatine) 5 mg tablet Take 1 tablet (5 mg) by mouth 3 (three) times a week. Tu/Th/Sa       acetaminophen (Tylenol) 500 mg tablet Take 1 tablet (500 mg) by mouth every 8 hours if needed for mild pain (1 - 3).       calcium carbonate (Tums) 250 mg ([place] 100 mg) chewable split tablet Take 2 half tablet (500 mg) by mouth 3 times a day as needed.       diazePAM (Valium) 10 mg tablet Take 0.5 tablets (5 mg) by mouth if needed (vertigo,).       gabapentin (Neurontin) 100 mg capsule Take 1 capsule (100 mg) by mouth once daily in the morning.       insulin asp prt-insulin aspart (NovoLOG Mix 70-30 U-100 Insuln) 100 unit/mL (70-30) injection Inject 10 Units under the skin once daily in the morning. Take as directed per insulin instructions.       insulin asp prt-insulin aspart (NovoLOG Mix 70-30) 100 unit/mL (70-30) injection Inject 8 Units under the skin once daily in the evening.  Take as directed per insulin instructions.       oxyCODONE-acetaminophen (Percocet) 5-325 mg tablet Take 0.5-1 tablets by mouth every 12 hours if needed for severe pain (7 - 10).           The list below reflects the updated allergy list. Please review each documented allergy for additional clarification and justification.  Allergies  Reviewed by Carlita Britton, EmmaD on 10/17/2023        Severity Reactions Comments    Darbepoetin Tong-albumin Not Specified Unknown Abdominal pain and headache    Povidone-iodine Not Specified Itching Pt. States redness and itchiness to area            Below are additional concerns with the patient's PTA list.    The patient was a good historian, able to identify medications and correct directions for use. The patient requests a refill for diazepam, and stated they take the lowest dose Eliquis once daily, despite BID sig on prescription.     Percocet 5mg: LF 10/9/23, #30, 15DS  Gabapentin 100mg: LF 9/30/23, #90, 30DS    Sources: [facility] Everywhere Clinical Summary generated 10/16/23, [facility] electronic pharmacy fill data, OARRS, pt interview.    Carlita Britton, PharmD  Transitions of Care Clinical Pharmacist  [facility]  Please reach out via [employer] Secure Chat for questions, or if no response call ev-social or [place] “MedRec”'

## 2023-10-17 NOTE — CONSULTS
Inpatient consult to Nephrology  Consult performed by: Greg Keyes MD  Consult ordered by: DAVID Link      Subjective     Interval History: Jamee Coronel , ESRD patient admitted with Trimalleolar fracture of ankle, now s/p R  hind foot nail for tri mal ankle fx on 10/16   She gets dialysis Tues/Thurs/Saturday at [facility]/Dr Oliver berger is her primary nephrologist. last HD 10/14 with post weight 76.2 kg. Dry Weight  76.5 kg. She has a Right Upper  arm AV Graft    Past Medical History:   Diagnosis Date    A-fib (CMS/[facility])     Diabetes mellitus (CMS/[facility])     Esophageal varices (CMS/[facility])     ESRD (end stage renal disease) (CMS/[facility])     Fatigue 09/21/2021    Hypertension     Impaired function of upper extremity 02/07/2020    Jaundice 09/19/2018    Liver cirrhosis secondary to GEORGE (CMS/[facility])     Lower urinary tract infectious disease 10/06/2014    Metabolic acidosis 04/20/2015    Osteomyelitis (CMS/[facility])     Osteoporosis     Platelets decreased (CMS/[facility]) 09/22/2023            Current Facility-Administered Medications:     acetaminophen (Tylenol) tablet 975 mg, 975 mg, oral, q8h, DAVID Link, 975 mg at 10/17/23 0855    [Held by provider] apixaban (Eliquis) tablet 5 mg, 5 mg, oral, BID, DAVID Link    B complex-vitamin C-folic acid (Nephrocaps) capsule 1 capsule, 1 capsule, oral, Daily, DAVID Link, 1 capsule at 10/17/23 0641    dextrose 10 % in water (D10W) infusion, 0.3 g/kg/hr, intravenous, Once PRN, DAVID Link    dextrose 50 % injection 25 g, 25 g, intravenous, q15 min PRN, DAVID Link    diclofenac sodium (Voltaren) 1 % gel 1 Application, 4 g, Topical, BID, DAVID Link    gabapentin (Neurontin) capsule 100 mg, 100 mg, oral, q AM, DAVID Link, 100 mg at 10/17/23 0855    gabapentin (Neurontin) capsule 200 mg, 200 mg, oral, Nightly, Sagrario Nascimento, APRN-CNP, 200 mg at 10/16/23 2052    glucagon  (Glucagen) injection 1 mg, 1 mg, intramuscular, q15 min PRN, Sagrario ANDREINA Nascimento APRN-CNP    HYDROmorphone (Dilaudid) injection 0.5 mg, 0.5 mg, intravenous, q6h PRN, Sagrario ANDREINA Nascimento APRN-CNP, 0.6 mg at 10/16/23 1304    insulin lispro (HumaLOG) injection 0-5 Units, 0-5 Units, subcutaneous, TID with meals, Sagrario ANDREINA Nascimento APRN-CNP, 1 Units at 10/17/23 0850    lidocaine 4 % patch 1 patch, 1 patch, transdermal, Daily, Sagrario Nascimento APRN-CNP    lidocaine-epinephrine (Xylocaine W/EPI) 1 %-1:100,000 injection 20 mL, 20 mL, injection, Once, Sherly Cooney DO    midodrine (Proamatine) tablet 5 mg, 5 mg, oral, Once per day on Tue Thu Sat, Sagrario Nascimento APRN-CNP, 5 mg at 10/17/23 0855    nystatin (Mycostatin) 100,000 unit/gram powder 1 Application, 1 Application, Topical, TID, Sagrario Nascimento APRN-CNP, 1 Application at 10/16/23 2059    ondansetron (Zofran) injection 4 mg, 4 mg, intravenous, q6h PRN, Sagrario ANDREINA Nascimento APRN-CNP, 4 mg at 10/16/23 1323    oxyCODONE (Roxicodone) immediate release tablet 5 mg, 5 mg, oral, q4h PRN, Sagrario Nascimento APRN-CNP, 5 mg at 10/15/23 2246    sennosides-docusate sodium (Guillermina-Colace) 8.6-50 mg per tablet 2 tablet, 2 tablet, oral, BID, KIN Link-CNP, 2 tablet at 10/16/23 2052    Physical Exam  Physical Exam  Constitutional:       Appearance: Normal appearance.   HENT:      Head: Normocephalic and atraumatic.      Nose: Nose normal.      Mouth/Throat:      Mouth: Mucous membranes are moist.   Eyes:      Extraocular Movements: Extraocular movements intact.      Conjunctiva/sclera: Conjunctivae normal.   Cardiovascular:      Rate and Rhythm: Normal rate and regular rhythm.      Pulses: Normal pulses.      Heart sounds: Normal heart sounds.   Pulmonary:      Effort: Pulmonary effort is normal.      Breath sounds: Normal breath sounds.   Abdominal:      General: Abdomen is flat.      Palpations: Abdomen is soft.   Musculoskeletal:      Cervical back: Neck supple.   Skin:     General:  Skin is warm and dry.   Neurological:      Mental Status: She is alert.   Psychiatric:         Mood and Affect: Mood normal.       Vital signs in last 24 hours:  Temp:  [35.5 °C (95.9 °F)-36.6 °C (97.9 °F)] 35.5 °C (95.9 °F)  Heart Rate:  [62-74] 69  Resp:  [12-19] 18  BP: ()/(39-60) 107/39         Labs:  Results from last 7 days   Lab Units 10/17/23  0942   WBC AUTO x10*3/uL 6.7   RBC AUTO x10*6/uL 2.26*   HEMOGLOBIN g/dL 7.5*   HEMATOCRIT % 22.5*     Results from last 7 days   Lab Units 10/15/23  0930   SODIUM mmol/L 136  137   POTASSIUM mmol/L 4.7  4.7   CHLORIDE mmol/L 96*  97*   CO2 mmol/L 23  27   BUN mg/dL 20  20   CREATININE mg/dL 4.17*  4.24*   CALCIUM mg/dL 8.8  9.0   BILIRUBIN TOTAL mg/dL 0.8   ALT U/L 19   AST U/L 66*              Assessment/Plan   Patient seen and examined while on dialysis, recent events, labs, medications reviewed. Will follow overall management s/p ankle fracture per primary team, and continue regular dialysis.    Principal Problem:    Trimalleolar fracture of ankle, closed, right, initial encounter        Greg Keyes MD  10/17/2023  10:58 AM

## 2023-10-17 NOTE — CARE PLAN
Problem: Fall/Injury  Goal: Not fall by end of shift  10/16/2023 2300 by Vadim Adamson RN  Outcome: Progressing  10/16/2023 2259 by Vadim Adamson RN  Outcome: Progressing  10/16/2023 2258 by Vadim Adamson RN  Outcome: Progressing  Goal: Be free from injury by end of the shift  10/16/2023 2300 by Vadim Adamson RN  Outcome: Progressing  10/16/2023 2259 by Vadim Adamson RN  Outcome: Progressing  10/16/2023 2258 by Vadim Adamson RN  Outcome: Progressing  Goal: Verbalize understanding of personal risk factors for fall in the hospital  10/16/2023 2300 by Vadim Adamson RN  Outcome: Progressing  10/16/2023 2259 by Vadim Adamson RN  Outcome: Progressing  10/16/2023 2258 by Vadim Adamson RN  Outcome: Progressing  Goal: Verbalize understanding of risk factor reduction measures to prevent injury from fall in the home  10/16/2023 2300 by Vadim Adamson, RAHUL  Outcome: Progressing  10/16/2023 2259 by Vadim Adamson RN  Outcome: Progressing  10/16/2023 2258 by Vadim Adamson RN  Outcome: Progressing  Goal: Use assistive devices by end of the shift  10/16/2023 2300 by Vadim Adamson RN  Outcome: Progressing  10/16/2023 2259 by Vadim Adamson RN  Outcome: Progressing  10/16/2023 2258 by Vadim Adamson RN  Outcome: Progressing  Goal: Pace activities to prevent fatigue by end of the shift  10/16/2023 2300 by Vadim Adamson RN  Outcome: Progressing  10/16/2023 2259 by Vadim Adamson RN  Outcome: Progressing  10/16/2023 2258 by Vadim Adamson RN  Outcome: Progressing   The patient's goals for the shift include have minimal pain    The clinical goals for the shift include decrease pain

## 2023-10-17 NOTE — CARE PLAN
Problem: Mobility  Goal: STG - Patient will ambulate 10' with FWW and maintain NWB RLE   Description: May progress distance as appropriate   Outcome: Progressing     Problem: Transfers  Goal: STG - Patient will perform bed mobility indep   Outcome: Progressing  Goal: STG - Patient will transfer sit to and from stand min A with LRAD and maintain NWB RLE   Outcome: Progressing

## 2023-10-18 LAB
BLOOD EXPIRATION DATE: NORMAL
BLOOD EXPIRATION DATE: NORMAL
DISPENSE STATUS: NORMAL
DISPENSE STATUS: NORMAL
GLUCOSE BLD MANUAL STRIP-MCNC: 132 MG/DL (ref 74–99)
GLUCOSE BLD MANUAL STRIP-MCNC: 190 MG/DL (ref 74–99)
GLUCOSE BLD MANUAL STRIP-MCNC: 194 MG/DL (ref 74–99)
PRODUCT BLOOD TYPE: 5100
PRODUCT BLOOD TYPE: 5100
PRODUCT CODE: NORMAL
PRODUCT CODE: NORMAL
UNIT ABO: NORMAL
UNIT ABO: NORMAL
UNIT NUMBER: NORMAL
UNIT NUMBER: NORMAL
UNIT RH: NORMAL
UNIT RH: NORMAL
UNIT VOLUME: 283
UNIT VOLUME: 284
XM INTEP: NORMAL
XM INTEP: NORMAL

## 2023-10-18 PROCEDURE — 99233 SBSQ HOSP IP/OBS HIGH 50: CPT | Performed by: INTERNAL MEDICINE

## 2023-10-18 PROCEDURE — 2500000001 HC RX 250 WO HCPCS SELF ADMINISTERED DRUGS (ALT 637 FOR MEDICARE OP): Performed by: NURSE PRACTITIONER

## 2023-10-18 PROCEDURE — 2060000001 HC INTERMEDIATE ICU ROOM DAILY

## 2023-10-18 PROCEDURE — 82947 ASSAY GLUCOSE BLOOD QUANT: CPT | Mod: CMCLAB

## 2023-10-18 PROCEDURE — 99232 SBSQ HOSP IP/OBS MODERATE 35: CPT | Performed by: NURSE PRACTITIONER

## 2023-10-18 RX ADMIN — GABAPENTIN 200 MG: 100 CAPSULE ORAL at 20:49

## 2023-10-18 RX ADMIN — ASCORBIC ACID, THIAMINE MONONITRATE,RIBOFLAVIN, NIACINAMIDE, PYRIDOXINE HYDROCHLORIDE, FOLIC ACID, CYANOCOBALAMIN, BIOTIN, CALCIUM PANTOTHENATE, 1 CAPSULE: 100; 1.5; 1.7; 20; 10; 1; 6000; 150000; 5 CAPSULE, LIQUID FILLED ORAL at 06:11

## 2023-10-18 RX ADMIN — ACETAMINOPHEN 975 MG: 325 TABLET ORAL at 00:20

## 2023-10-18 RX ADMIN — APIXABAN 5 MG: 5 TABLET, FILM COATED ORAL at 20:49

## 2023-10-18 RX ADMIN — DICLOFENAC SODIUM 1 APPLICATION: 10 GEL TOPICAL at 09:22

## 2023-10-18 RX ADMIN — NYSTATIN 1 APPLICATION: 100000 POWDER TOPICAL at 09:23

## 2023-10-18 RX ADMIN — INSULIN LISPRO 1 UNITS: 100 INJECTION, SOLUTION INTRAVENOUS; SUBCUTANEOUS at 16:23

## 2023-10-18 RX ADMIN — APIXABAN 5 MG: 5 TABLET, FILM COATED ORAL at 09:20

## 2023-10-18 RX ADMIN — ACETAMINOPHEN 975 MG: 325 TABLET ORAL at 16:22

## 2023-10-18 RX ADMIN — GABAPENTIN 100 MG: 100 CAPSULE ORAL at 09:20

## 2023-10-18 RX ADMIN — NYSTATIN 1 APPLICATION: 100000 POWDER TOPICAL at 20:49

## 2023-10-18 ASSESSMENT — PAIN SCALES - PAIN ASSESSMENT IN ADVANCED DEMENTIA (PAINAD): BREATHING: NORMAL

## 2023-10-18 ASSESSMENT — COGNITIVE AND FUNCTIONAL STATUS - GENERAL
DRESSING REGULAR LOWER BODY CLOTHING: TOTAL
WALKING IN HOSPITAL ROOM: TOTAL
TOILETING: A LITTLE
MOVING TO AND FROM BED TO CHAIR: TOTAL
DRESSING REGULAR UPPER BODY CLOTHING: A LITTLE
STANDING UP FROM CHAIR USING ARMS: TOTAL
HELP NEEDED FOR BATHING: A LITTLE
EATING MEALS: A LITTLE
CLIMB 3 TO 5 STEPS WITH RAILING: TOTAL
PERSONAL GROOMING: A LITTLE
MOBILITY SCORE: 11
DAILY ACTIVITIY SCORE: 16
TURNING FROM BACK TO SIDE WHILE IN FLAT BAD: A LITTLE

## 2023-10-18 ASSESSMENT — PAIN SCALES - GENERAL
PAINLEVEL_OUTOF10: 0 - NO PAIN
PAINLEVEL_OUTOF10: 0 - NO PAIN
PAINLEVEL_OUTOF10: 4

## 2023-10-18 NOTE — PROGRESS NOTES
Jamee Coronel is a 78 y.o. female on day 3 of admission presenting with Trimalleolar fracture of ankle, closed, right, initial encounter.      Dietary Orders (From admission, onward)               Adult diet Carb Controlled; 75 gram carb/meal, 45 gram Carb evening snack  Diet effective now        Question Answer Comment   Diet type Carb Controlled    Carb diet selection: 75 gram carb/meal, 45 gram Carb evening snack                          Objective     Vitals  Temp:  [35.5 °C (95.9 °F)-36.6 °C (97.9 °F)] 36.4 °C (97.5 °F)  Heart Rate:  [53-74] 53  Resp:  [18-21] 21  BP: (103-121)/(35-50) 103/35       Pain Score: 0 - No pain         Peripheral IV 10/15/23 20 G Left;Anterior Forearm (Active)   Number of days: 1       Urethral Catheter Non-latex 16 Fr. (Active)   Number of days: 0       Vent Settings       Intake/Output Summary (Last 24 hours) at 10/18/2023 0818  Last data filed at 10/17/2023 1328  Gross per 24 hour   Intake 1000 ml   Output 900 ml   Net 100 ml         Relevant Results              This patient has a urinary catheter   Reason for the urinary catheter remaining today? Urine catheter unnecessary, will be removed today           A/p      Ms. Lidia Krishna is a 78-year-old female with PMHx: T2DM, HTN, cirrhosis with Blake, esophageal varices, A-fib on Eliquis, ESRD, osteomyelitis of the back, anemia of chronic disease, thrombocytopenia.  Admitted for fall down and fracture, work up showed hemoglobin 7.9, platelets 81, x-rays  right trimalleolar fracture Ortho plan to take her to surgery 10/16.  Patient to be admitted regular admit for right trimalleolar fracture.   Pending Placement.     #. Right trimalleolar fracture s/p surgery 10/16.  #. Fall  #. G. Weakness  -Ortho consult    -Zhang  -Will hold apixaban  -Pain Tylenol scheduled, Oxy 5 every 4 as needed for pain 4-10, Dilaudid every 6 as needed breakthrough pain  -PT OT eval and treat  -Zofran as needed for nausea   Per ortho:   Orthopaedics will  "follow peripherally while patient is in house. Recommend:  - Pt will need to be NWB RLE until first follow up appointment.   - Patient can be continued on normal anticoagulation regimen from an orthopedics perspective, recommend at least 4 weeks  - Dressing can be removed in 7 days. Can shower after that.  - We recommend discharge with calcium(as carbonate)-VitD 600mg-400IU (10mcg) BID x30d  - Patient should follow up w/ Dr. Contreras in 3 weeks after discharge for post-operative appointment (patient may call [phone] to schedule).     #. T2DM  Neuropathy  -Hold NovoLog 70/30  10 units every morning and 8 units pm  -Mild Humalog sliding scale with meals and at bedtime  -Continue home gabapentin 100 mg every morning and 200 nightly     #. ESRD  -Dialysis at [facility] in [facility] Tuesday/Thursday/Saturday  -Continue [place]  -Continue home midodrine 5 mg on dialysis today     #. Anemia of chronic disease  #. Thrombocytopenia  #. Liver disease Cirrhosis\"   -Hemoglobin 7.9  -Platelets 81     #. HTN  Stable not on any current medications     #. Osteomyelitis of the spine  -Completed full course of cefepime and Vanc last week  -Voltaren and lido to back     Code Status: Full  DVT ppx:  on hold due to surgery will resume 10/18  Disp: PT/ot     Spoke to my patient, Discussed the medical, social conditions, the reason for this admission explained our plan and recommendations.  Time spent on the assessment of patient, gathering and interpreting data, review of medical record/patient history, personally reviewing radiographic imaging. With greater than 50% spent in personal discussion with patient.  Time >  35 min         Dean Chavarria MD   " [>50% of Time Spent on Counseling for ____] : Greater than 50% of the encounter time was spent on counseling for [unfilled] [Time Spent: ___ minutes] : I have spent [unfilled] minutes of face to face time with the patient

## 2023-10-18 NOTE — PROGRESS NOTES
[facility] unable to accept. PCN updated pt on accepting SNFs. Patient confirmed she is agreeable to [facility] because they have onsite HD. PCN updated SNF and TCC.    Angelita Nicolas

## 2023-10-18 NOTE — PROGRESS NOTES
PCN updated pt on moderate intensity PT rec. Patient is agreeable to SNF. FOC is [facility]. Patient is agreeable to referral being submitted to additional SNFs close to her home. Referrals submitted for review.   GOYO Nicolas

## 2023-10-18 NOTE — PROGRESS NOTES
"Jamee Coronel is a 78 y.o. female on day 3 of admission presenting with Trimalleolar fracture of ankle, closed, right, initial encounter.    Subjective   Pt resting in bed watching tv. She denies sob, n/v/d, constipation, fever, cough, chills, chest pains, pain     Objective   Physical Exam  Vitals and nursing note reviewed.   Constitutional:       Appearance: Normal appearance.   Cardiovascular:      Rate and Rhythm: Normal rate and regular rhythm.   Pulmonary:      Effort: Pulmonary effort is normal.      Breath sounds: Normal breath sounds.   Abdominal:      Palpations: Abdomen is soft.   Genitourinary:     Comments: Ext cath  Pt states make little urine  Musculoskeletal:      Comments: Acewrap to rt leg   Skin:     General: Skin is warm and dry.   Neurological:      Mental Status: She is alert and oriented to person, place, and time.   Psychiatric:         Mood and Affect: Mood normal.         Behavior: Behavior normal.       Last Recorded Vitals  Blood pressure (!) 96/48, pulse 57, temperature 36.5 °C (97.7 °F), resp. rate 16, height 1.676 m (5' 6\"), weight 80.4 kg (177 lb 4 oz), SpO2 99 %.  Intake/Output last 3 Shifts:  I/O last 3 completed shifts:  In: 1000 (12.4 mL/kg) [I.V.:600 (7.5 mL/kg); Other:400]  Out: 900 (11.2 mL/kg) [Other:900]  Weight: 80.4 kg     No current facility-administered medications on file prior to encounter.     Current Outpatient Medications on File Prior to Encounter   Medication Sig Dispense Refill    apixaban (Eliquis) 2.5 mg tablet Take 1 tablet (2.5 mg) by mouth twice a day.      B complex-vitamin C-folic acid ([place] Caps) 1 mg capsule Take 1 capsule by mouth once daily.      gabapentin (Neurontin) 100 mg capsule Take 2 capsules (200 mg) by mouth once daily at bedtime.      lidocaine (Lidoderm) 5 % patch Place 1 patch on the skin once daily.      meclizine (Antivert) 25 mg tablet Take 0.5 tablets (12.5 mg) by mouth 3 times a day as needed for dizziness.      methocarbamol " (Robaxin) 500 mg tablet Take 1 tablet (500 mg) by mouth every 8 hours if needed.      midodrine (Proamatine) 5 mg tablet Take 1 tablet (5 mg) by mouth 3 (three) times a week. Tu/Th/Sa      [DISCONTINUED] cefepime (Maxipime) IV Infuse 100 mL (2 g) into a venous catheter 3 (three) times a week. Tu/Th/Sa w/dialysis      [DISCONTINUED] diclofenac epolamine 1.3 % patch Place 1 Application on the skin every 12 hours if needed.      [DISCONTINUED] omeprazole (PriLOSEC) 40 mg DR capsule Take 1 capsule (40 mg) by mouth once daily.      acetaminophen (Tylenol) 500 mg tablet Take 1 tablet (500 mg) by mouth every 8 hours if needed for mild pain (1 - 3).      calcium carbonate (Tums) 250 mg ([place] 100 mg) chewable split tablet Take 2 half tablet (500 mg) by mouth 3 times a day as needed.      gabapentin (Neurontin) 100 mg capsule Take 1 capsule (100 mg) by mouth once daily in the morning.      insulin asp prt-insulin aspart (NovoLOG Mix 70-30 U-100 Insuln) 100 unit/mL (70-30) injection Inject 10 Units under the skin once daily in the morning. Take as directed per insulin instructions.      insulin asp prt-insulin aspart (NovoLOG Mix 70-30) 100 unit/mL (70-30) injection Inject 8 Units under the skin once daily in the evening. Take as directed per insulin instructions.      oxyCODONE-acetaminophen (Percocet) 5-325 mg tablet Take 0.5-1 tablets by mouth every 12 hours if needed for severe pain (7 - 10).      [DISCONTINUED] diazePAM (Valium) 10 mg tablet Take 0.5 tablets (5 mg) by mouth if needed (vertigo,).        Results for orders placed or performed during the hospital encounter of 10/15/23 (from the past 24 hour(s))   POCT GLUCOSE   Result Value Ref Range    POCT Glucose 166 (H) 74 - 99 mg/dL   Prepare Plasma: 1 Units   Result Value Ref Range    PRODUCT CODE X5866K42     Unit Number [id number]     Unit ABO A     Unit RH POS     Dispense Status RE     Blood Expiration Date October 19, 2023 03:59 EDT     PRODUCT BLOOD TYPE  6200     UNIT VOLUME 196    POCT GLUCOSE   Result Value Ref Range    POCT Glucose 163 (H) 74 - 99 mg/dL   POCT GLUCOSE   Result Value Ref Range    POCT Glucose 176 (H) 74 - 99 mg/dL   POCT GLUCOSE   Result Value Ref Range    POCT Glucose 132 (H) 74 - 99 mg/dL   POCT GLUCOSE   Result Value Ref Range    POCT Glucose 194 (H) 74 - 99 mg/dL                 Assessment/Plan   Principal Problem:    Trimalleolar fracture of ankle, closed, right, initial encounter    Tolerated hemodialysis yesterday with net fluid loss 900cc    Is hemodynamically stable, euvolemic on exam and has stable electrolytes     BP: midodrine (Proamatine) tablet 5 mg on dialysis days    Outpatient Dialysis schedule: [facility]/Dr. Jeannie Wallace       Access: rt graft with +thrill/bruit- no issues - able to achieve      Anemia of ESRD:  refused EPO stating it makes her itch, abd pain..current hgb 7.5..will cont to monitor     CKD-MBD: Phosphate Binder:  not on phos binder. B complex-vitamin C-folic acid (Nephrocaps) capsule 1 capsule daily,      Plan HD tomorrow with [facility] as tolerated     Renal diet      Please obtain daily standing wt (if possible)     Medication to be adjusted for ESRD      Patient to continue regular HD schedule while inpatient and to follow with the outpatient nephrologist at discharge            KIN Franco-CNP

## 2023-10-18 NOTE — CARE PLAN
The patient's goals for the shift include have minimal pain    The clinical goals for the shift include manage pain      Problem: Fall/Injury  Goal: Not fall by end of shift  Outcome: Progressing  Goal: Be free from injury by end of the shift  Outcome: Progressing  Goal: Verbalize understanding of personal risk factors for fall in the hospital  Outcome: Progressing  Goal: Verbalize understanding of risk factor reduction measures to prevent injury from fall in the home  Outcome: Progressing  Goal: Use assistive devices by end of the shift  Outcome: Progressing  Goal: Pace activities to prevent fatigue by end of the shift  Outcome: Progressing

## 2023-10-19 ENCOUNTER — APPOINTMENT (OUTPATIENT)
Dept: DIALYSIS | Facility: HOSPITAL | Age: 78
DRG: 492 | End: 2023-10-19
Payer: MEDICARE

## 2023-10-19 ENCOUNTER — PHARMACY VISIT (OUTPATIENT)
Dept: PHARMACY | Facility: CLINIC | Age: 78
End: 2023-10-19
Payer: MEDICARE

## 2023-10-19 LAB
ALBUMIN SERPL BCP-MCNC: 2.8 G/DL (ref 3.4–5)
ALP SERPL-CCNC: 101 U/L (ref 33–136)
ALT SERPL W P-5'-P-CCNC: 4 U/L (ref 7–45)
ANION GAP SERPL CALC-SCNC: 14 MMOL/L (ref 10–20)
AST SERPL W P-5'-P-CCNC: 25 U/L (ref 9–39)
BILIRUB SERPL-MCNC: 0.5 MG/DL (ref 0–1.2)
BUN SERPL-MCNC: 37 MG/DL (ref 6–23)
CALCIUM SERPL-MCNC: 8.2 MG/DL (ref 8.6–10.6)
CHLORIDE SERPL-SCNC: 99 MMOL/L (ref 98–107)
CO2 SERPL-SCNC: 29 MMOL/L (ref 21–32)
CREAT SERPL-MCNC: 5.11 MG/DL (ref 0.5–1.05)
CRP SERPL-MCNC: 8.78 MG/DL
ERYTHROCYTE [DISTWIDTH] IN BLOOD BY AUTOMATED COUNT: 15.2 % (ref 11.5–14.5)
GFR SERPL CREATININE-BSD FRML MDRD: 8 ML/MIN/1.73M*2
GLUCOSE BLD MANUAL STRIP-MCNC: 105 MG/DL (ref 74–99)
GLUCOSE BLD MANUAL STRIP-MCNC: 163 MG/DL (ref 74–99)
GLUCOSE BLD MANUAL STRIP-MCNC: 220 MG/DL (ref 74–99)
GLUCOSE SERPL-MCNC: 148 MG/DL (ref 74–99)
HCT VFR BLD AUTO: 22.1 % (ref 36–46)
HGB BLD-MCNC: 7.1 G/DL (ref 12–16)
MCH RBC QN AUTO: 32.9 PG (ref 26–34)
MCHC RBC AUTO-ENTMCNC: 32.1 G/DL (ref 32–36)
MCV RBC AUTO: 102 FL (ref 80–100)
NRBC BLD-RTO: 0 /100 WBCS (ref 0–0)
PLATELET # BLD AUTO: 99 X10*3/UL (ref 150–450)
PMV BLD AUTO: 9.5 FL (ref 7.5–11.5)
POTASSIUM SERPL-SCNC: 4 MMOL/L (ref 3.5–5.3)
PROT SERPL-MCNC: 4.8 G/DL (ref 6.4–8.2)
RBC # BLD AUTO: 2.16 X10*6/UL (ref 4–5.2)
SODIUM SERPL-SCNC: 138 MMOL/L (ref 136–145)
WBC # BLD AUTO: 2.9 X10*3/UL (ref 4.4–11.3)

## 2023-10-19 PROCEDURE — 82947 ASSAY GLUCOSE BLOOD QUANT: CPT

## 2023-10-19 PROCEDURE — 90935 HEMODIALYSIS ONE EVALUATION: CPT | Performed by: INTERNAL MEDICINE

## 2023-10-19 PROCEDURE — 8010000001 HC DIALYSIS - HEMODIALYSIS PER DAY

## 2023-10-19 PROCEDURE — 85027 COMPLETE CBC AUTOMATED: CPT | Performed by: NURSE PRACTITIONER

## 2023-10-19 PROCEDURE — 36415 COLL VENOUS BLD VENIPUNCTURE: CPT | Performed by: INTERNAL MEDICINE

## 2023-10-19 PROCEDURE — 2500000001 HC RX 250 WO HCPCS SELF ADMINISTERED DRUGS (ALT 637 FOR MEDICARE OP): Performed by: NURSE PRACTITIONER

## 2023-10-19 PROCEDURE — 99239 HOSP IP/OBS DSCHRG MGMT >30: CPT | Performed by: INTERNAL MEDICINE

## 2023-10-19 PROCEDURE — 97530 THERAPEUTIC ACTIVITIES: CPT | Mod: GP

## 2023-10-19 PROCEDURE — 2060000001 HC INTERMEDIATE ICU ROOM DAILY

## 2023-10-19 PROCEDURE — 97110 THERAPEUTIC EXERCISES: CPT | Mod: GP

## 2023-10-19 PROCEDURE — 80053 COMPREHEN METABOLIC PANEL: CPT | Mod: CMCLAB | Performed by: INTERNAL MEDICINE

## 2023-10-19 PROCEDURE — 86140 C-REACTIVE PROTEIN: CPT | Performed by: INTERNAL MEDICINE

## 2023-10-19 RX ORDER — MIDODRINE HYDROCHLORIDE 5 MG/1
5 TABLET ORAL 3 TIMES DAILY
Qty: 90 TABLET | Refills: 0 | Status: SHIPPED | OUTPATIENT
Start: 2023-10-19 | End: 2023-10-19 | Stop reason: DRUGHIGH

## 2023-10-19 RX ORDER — LIDOCAINE 560 MG/1
1 PATCH PERCUTANEOUS; TOPICAL; TRANSDERMAL DAILY
Qty: 30 PATCH | Refills: 0 | Status: SHIPPED | OUTPATIENT
Start: 2023-10-19 | End: 2023-11-18

## 2023-10-19 RX ORDER — DICLOFENAC SODIUM 10 MG/G
4 GEL TOPICAL 2 TIMES DAILY
Qty: 200 G | Refills: 0 | Status: SHIPPED | OUTPATIENT
Start: 2023-10-19 | End: 2023-11-18

## 2023-10-19 RX ORDER — ACETAMINOPHEN 325 MG/1
975 TABLET ORAL EVERY 8 HOURS
Qty: 270 TABLET | Refills: 0 | Status: SHIPPED | OUTPATIENT
Start: 2023-10-19 | End: 2023-11-18

## 2023-10-19 RX ADMIN — NYSTATIN 1 APPLICATION: 100000 POWDER TOPICAL at 17:44

## 2023-10-19 RX ADMIN — GABAPENTIN 200 MG: 100 CAPSULE ORAL at 20:40

## 2023-10-19 RX ADMIN — OXYCODONE HYDROCHLORIDE 5 MG: 5 TABLET ORAL at 14:02

## 2023-10-19 RX ADMIN — ACETAMINOPHEN 975 MG: 325 TABLET ORAL at 00:11

## 2023-10-19 RX ADMIN — APIXABAN 5 MG: 5 TABLET, FILM COATED ORAL at 20:39

## 2023-10-19 RX ADMIN — ACETAMINOPHEN 975 MG: 325 TABLET ORAL at 17:43

## 2023-10-19 RX ADMIN — DICLOFENAC SODIUM 1 APPLICATION: 10 GEL TOPICAL at 14:03

## 2023-10-19 RX ADMIN — MIDODRINE HYDROCHLORIDE 5 MG: 5 TABLET ORAL at 07:57

## 2023-10-19 RX ADMIN — SENNOSIDES AND DOCUSATE SODIUM 2 TABLET: 50; 8.6 TABLET ORAL at 14:02

## 2023-10-19 RX ADMIN — GABAPENTIN 100 MG: 100 CAPSULE ORAL at 14:02

## 2023-10-19 RX ADMIN — APIXABAN 5 MG: 5 TABLET, FILM COATED ORAL at 14:02

## 2023-10-19 RX ADMIN — NYSTATIN 1 APPLICATION: 100000 POWDER TOPICAL at 14:04

## 2023-10-19 RX ADMIN — INSULIN LISPRO 2 UNITS: 100 INJECTION, SOLUTION INTRAVENOUS; SUBCUTANEOUS at 17:42

## 2023-10-19 RX ADMIN — OXYCODONE HYDROCHLORIDE 5 MG: 5 TABLET ORAL at 20:39

## 2023-10-19 RX ADMIN — ASCORBIC ACID, THIAMINE MONONITRATE,RIBOFLAVIN, NIACINAMIDE, PYRIDOXINE HYDROCHLORIDE, FOLIC ACID, CYANOCOBALAMIN, BIOTIN, CALCIUM PANTOTHENATE, 1 CAPSULE: 100; 1.5; 1.7; 20; 10; 1; 6000; 150000; 5 CAPSULE, LIQUID FILLED ORAL at 06:31

## 2023-10-19 ASSESSMENT — COGNITIVE AND FUNCTIONAL STATUS - GENERAL
PERSONAL GROOMING: A LITTLE
DRESSING REGULAR LOWER BODY CLOTHING: TOTAL
WALKING IN HOSPITAL ROOM: TOTAL
DRESSING REGULAR UPPER BODY CLOTHING: A LITTLE
CLIMB 3 TO 5 STEPS WITH RAILING: TOTAL
CLIMB 3 TO 5 STEPS WITH RAILING: TOTAL
MOVING FROM LYING ON BACK TO SITTING ON SIDE OF FLAT BED WITH BEDRAILS: A LITTLE
MOVING TO AND FROM BED TO CHAIR: TOTAL
MOBILITY SCORE: 10
STANDING UP FROM CHAIR USING ARMS: TOTAL
WALKING IN HOSPITAL ROOM: TOTAL
HELP NEEDED FOR BATHING: A LITTLE
DAILY ACTIVITIY SCORE: 16
EATING MEALS: A LITTLE
MOVING FROM LYING ON BACK TO SITTING ON SIDE OF FLAT BED WITH BEDRAILS: A LITTLE
TURNING FROM BACK TO SIDE WHILE IN FLAT BAD: A LITTLE
TURNING FROM BACK TO SIDE WHILE IN FLAT BAD: A LITTLE
MOBILITY SCORE: 10
STANDING UP FROM CHAIR USING ARMS: TOTAL
MOVING TO AND FROM BED TO CHAIR: TOTAL
TOILETING: A LITTLE

## 2023-10-19 ASSESSMENT — PAIN SCALES - GENERAL
PAINLEVEL_OUTOF10: 7
PAINLEVEL_OUTOF10: 0 - NO PAIN
PAINLEVEL_OUTOF10: 4
PAINLEVEL_OUTOF10: 0 - NO PAIN
PAINLEVEL_OUTOF10: 3
PAINLEVEL_OUTOF10: 6

## 2023-10-19 ASSESSMENT — PAIN - FUNCTIONAL ASSESSMENT
PAIN_FUNCTIONAL_ASSESSMENT: 0-10
PAIN_FUNCTIONAL_ASSESSMENT: NO/DENIES PAIN
PAIN_FUNCTIONAL_ASSESSMENT: NO/DENIES PAIN
PAIN_FUNCTIONAL_ASSESSMENT: 0-10

## 2023-10-19 ASSESSMENT — PAIN SCALES - PAIN ASSESSMENT IN ADVANCED DEMENTIA (PAINAD): BREATHING: NORMAL

## 2023-10-19 NOTE — CARE PLAN
Transitional Care Coordinator Progress Note:   Pt was offered preference for SNF, pt chose [facility] SNF because the SNF can transport pt to her dialysis center ([facility] TTS at 6am) to keep her schedule,  JOSELIN Ku was notified.  Pt will discharge on 10/20 at 10am, Dr. Chavarria was notified. Care coordinator will continue to follow for discharge planning needs.     Daayna Henriquez MSN, RN-BC  Transitional Care Coordinator (TCC)  [phone]

## 2023-10-19 NOTE — PROGRESS NOTES
DISCHARGE MEDICATION REVIEW BY PHARMACY     NAME:  Jamee Coronel   MRN:  [id number]   SERVICE DATE: 10/19/2023   SERVICE TIME:  11:28 AM       The following discharge medications were reviewed by a pharmacist: Yes  The following recommendations were made:  duplicate medications were continued on pt's AVS  (eliquis 2.5mg, 5mg, APAP 975mg, 500mg, midodrine TIW, TID). Discussed with MD and made appropriate changes to what patient was on: eliquis 5mg (has been on 5mg inpatient but also doesn't meet criteria for reduced dose. Patient had issues with cost prior to admission), APAP 975mg, midodrine three times per week with HD. Patient is going to a SNF.       Medication List      START taking these medications     diclofenac sodium 1 % gel gel; Commonly known as: Voltaren; Apply 1   Application topically 2 times a day.     CHANGE how you take these medications     acetaminophen 325 mg tablet; Commonly known as: Tylenol; Take 3 tablets   (975 mg) by mouth every 8 hours.; What changed: medication strength, how   much to take, when to take this, reasons to take this   apixaban 5 mg tablet; Commonly known as: Eliquis; Take 1 tablet (5 mg)   by mouth 2 times a day.; What changed: medication strength, how much to   take, when to take this   * lidocaine 5 % patch; Commonly known as: Lidoderm; What changed:   Another medication with the same name was added. Make sure you understand   how and when to take each.   * lidocaine 4 % patch; Apply 1 patch once daily. Keep on for 12 hours,   then remove and keep off for 12 hours.; What changed: You were already   taking a medication with the same name, and this prescription was added.   Make sure you understand how and when to take each.  * This list has 2 medication(s) that are the same as other medications   prescribed for you. Read the directions carefully, and ask your doctor or   other care provider to review them with you.     CONTINUE taking these medications     calcium  carbonate 250 mg ([place] 100 mg) chewable split tablet; Commonly   known as: Tums   * gabapentin 100 mg capsule; Commonly known as: Neurontin   * gabapentin 100 mg capsule; Commonly known as: Neurontin   * insulin asp prt-insulin aspart 100 unit/mL (70-30) injection; Commonly   known as: NovoLOG Mix 70-30   * NovoLOG Mix 70-30 U-100 Insuln 100 unit/mL (70-30) injection; Generic   drug: insulin asp prt-insulin aspart   meclizine 25 mg tablet; Commonly known as: Antivert   methocarbamol 500 mg tablet; Commonly known as: Robaxin   midodrine 5 mg tablet; Commonly known as: Proamatine  * This list has 4 medication(s) that are the same as other medications   prescribed for you. Read the directions carefully, and ask your doctor or   other care provider to review them with you.     STOP taking these medications     oxyCODONE-acetaminophen 5-325 mg tablet; Commonly known as: Percocet   [place] Caps 1 mg capsule; Generic drug: B complex-vitamin C-folic acid       Adamaris Mcneill, PharmD, BCPS  [facility] Paloma[facility] 3 Pharmacist

## 2023-10-19 NOTE — PROGRESS NOTES
Per TCC, pt will dc to [facility]. [facility] confirmed they can provide transport to dialysis. [facility] confirmed they can accept tomorrow morning. [facility] confirmed transport for 10am 10/20. Report # . PCN updated TCC, pt and the floor.   GOYO Nicolas

## 2023-10-19 NOTE — DISCHARGE SUMMARY
Discharge Diagnosis  Trimalleolar fracture of ankle, closed, right, initial encounter    Issues Requiring Follow-U  fracture s/p surgery     Test Results Pending At Discharge  Pending Labs       No current pending labs.            Hospital Course  Ms. Lidia Krishna is a 78-year-old female with PMHx: T2DM, HTN, cirrhosis with Blake, esophageal varices, A-fib on Eliquis, ESRD, osteomyelitis of the back, anemia of chronic disease, thrombocytopenia.  Admitted for fall down and fracture, work up showed hemoglobin 7.9, platelets 81, x-rays  right trimalleolar fracture Ortho plan to take her to surgery 10/16.  Patient to be admitted regular admit for right trimalleolar fracture.  Resume Eliquis  Resume HD.       Pending Placement.      #. Right trimalleolar fracture s/p surgery 10/16.  #. Fall  #. G. Weakness  -Ortho consult    -Zhang  -Will hold apixaban  -Pain Tylenol scheduled, Oxy 5 every 4 as needed for pain 4-10, Dilaudid every 6 as needed breakthrough pain  -PT OT eval and treat  -Zofran as needed for nausea   Per ortho:   Orthopaedics will follow peripherally while patient is in house. Recommend:  - Pt will need to be NWB RLE until first follow up appointment.   - Patient can be continued on normal anticoagulation regimen from an orthopedics perspective, recommend at least 4 weeks  - Dressing can be removed in 7 days. Can shower after that.  - We recommend discharge with calcium(as carbonate)-VitD 600mg-400IU (10mcg) BID x30d  - Patient should follow up w/ Dr. Contreras in 3 weeks after discharge for post-operative appointment (patient may call [phone] to schedule).      #. T2DM  Neuropathy  -Hold NovoLog 70/30  10 units every morning and 8 units pm  -Mild Humalog sliding scale with meals and at bedtime  -Continue home gabapentin 100 mg every morning and 200 nightly     #. ESRD  -Dialysis at [facility] in [facility] Tuesday/Thursday/Saturday  -Continue [place]-Isidro  -Continue home midodrine 5 mg on dialysis today     #. Anemia  "of chronic disease  #. Thrombocytopenia  #. Liver disease Cirrhosis\"   -Hemoglobin 7.9  -Platelets 81     #. HTN  Stable not on any current medications     #. Osteomyelitis of the spine  -Completed full course of cefepime and Vanc last week  -Voltaren and lido to back       Pertinent Physical Exam At Time of Discharge  Physical Exam    Home Medications     Medication List      START taking these medications     diclofenac sodium 1 % gel gel; Commonly known as: Voltaren; Apply 1   Application topically 2 times a day.     CHANGE how you take these medications     * acetaminophen 500 mg tablet; Commonly known as: Tylenol; What changed:   Another medication with the same name was added. Make sure you understand   how and when to take each.   * acetaminophen 325 mg tablet; Commonly known as: Tylenol; Take 3   tablets (975 mg) by mouth every 8 hours.; What changed: You were already   taking a medication with the same name, and this prescription was added.   Make sure you understand how and when to take each.   * apixaban 2.5 mg tablet; Commonly known as: Eliquis; What changed:   Another medication with the same name was added. Make sure you understand   how and when to take each.   * apixaban 5 mg tablet; Commonly known as: Eliquis; Take 1 tablet (5 mg)   by mouth 2 times a day.; What changed: You were already taking a   medication with the same name, and this prescription was added. Make sure   you understand how and when to take each.   * lidocaine 5 % patch; Commonly known as: Lidoderm; What changed:   Another medication with the same name was added. Make sure you understand   how and when to take each.   * lidocaine 4 % patch; Place 1 patch over 12 hours on the skin once   daily. Remove & discard patch within 12 hours or as directed by MD.; What   changed: You were already taking a medication with the same name, and this   prescription was added. Make sure you understand how and when to take   each.   * midodrine 5 mg " tablet; Commonly known as: Proamatine; What changed:   Another medication with the same name was added. Make sure you understand   how and when to take each.   * midodrine 5 mg tablet; Commonly known as: Proamatine; Take 1 tablet (5   mg) by mouth 3 times a day.; What changed: You were already taking a   medication with the same name, and this prescription was added. Make sure   you understand how and when to take each.  * This list has 8 medication(s) that are the same as other medications   prescribed for you. Read the directions carefully, and ask your doctor or   other care provider to review them with you.     CONTINUE taking these medications     calcium carbonate 250 mg ([place] 100 mg) chewable split tablet; Commonly   known as: Tums   * gabapentin 100 mg capsule; Commonly known as: Neurontin   * gabapentin 100 mg capsule; Commonly known as: Neurontin   * insulin asp prt-insulin aspart 100 unit/mL (70-30) injection; Commonly   known as: NovoLOG Mix 70-30   * NovoLOG Mix 70-30 U-100 Insuln 100 unit/mL (70-30) injection; Generic   drug: insulin asp prt-insulin aspart   meclizine 25 mg tablet; Commonly known as: Antivert   methocarbamol 500 mg tablet; Commonly known as: Robaxin  * This list has 4 medication(s) that are the same as other medications   prescribed for you. Read the directions carefully, and ask your doctor or   other care provider to review them with you.     STOP taking these medications     oxyCODONE-acetaminophen 5-325 mg tablet; Commonly known as: Percocet     ASK your doctor about these medications     [place] Caps 1 mg capsule; Generic drug: B complex-vitamin C-folic acid       Outpatient Follow-Up  No future appointments.    Dean Chavarria MD

## 2023-10-19 NOTE — NURSING NOTE
Report from Sending RN:    Report From: [facility]  Recent Surgery of Procedure: Yes  Baseline Level of Consciousness (LOC): A and O x 4  Oxygen Use: Yes, prn  Type: NC  Diabetic: Yes  Last BP Med Given Day of Dialysis: None  Last Pain Med Given: yesterday  Lab Tests to be Obtained with Dialysis: Yes, Ck EMR  Blood Transfusion to be Given During Dialysis: No  Available IV Access: Yes  Medications to be Administered During Dialysis: No  Continuous IV Infusion Running: No  Restraints on Currently or in the Last 24 Hours: N/A  Hand-Off Communication: Pt is stable and denies pain.

## 2023-10-19 NOTE — PROGRESS NOTES
Jamee Coronel is a 78 y.o. female on day 4 of admission presenting with Trimalleolar fracture of ankle, closed, right, initial encounter.      Dietary Orders (From admission, onward)               Adult diet Carb Controlled; 75 gram carb/meal, 45 gram Carb evening snack  Diet effective now        Question Answer Comment   Diet type Carb Controlled    Carb diet selection: 75 gram carb/meal, 45 gram Carb evening snack                          Objective     Vitals  Temp:  [36.3 °C (97.3 °F)-36.6 °C (97.9 °F)] 36.3 °C (97.3 °F)  Heart Rate:  [57-71] 68  Resp:  [16-21] 21  BP: ()/(39-48) 100/41       Pain Score: 0 - No pain (Pt sleeping when RN entered room)         Peripheral IV 10/15/23 20 G Left;Anterior Forearm (Active)   Number of days: 1       Urethral Catheter Non-latex 16 Fr. (Active)   Number of days: 0       Vent Settings       Intake/Output Summary (Last 24 hours) at 10/19/2023 0814  Last data filed at 10/19/2023 0744  Gross per 24 hour   Intake 200 ml   Output --   Net 200 ml         Relevant Results          Scheduled medications  acetaminophen, 975 mg, oral, q8h  apixaban, 5 mg, oral, BID  B complex-vitamin C-folic acid, 1 capsule, oral, Daily  diclofenac sodium, 4 g, Topical, BID  gabapentin, 100 mg, oral, q AM  gabapentin, 200 mg, oral, Nightly  insulin lispro, 0-5 Units, subcutaneous, TID with meals  lidocaine, 1 patch, transdermal, Daily  lidocaine-epinephrine, 20 mL, injection, Once  midodrine, 5 mg, oral, Once per day on Tue Thu Sat  nystatin, 1 Application, Topical, TID  sennosides-docusate sodium, 2 tablet, oral, BID      Continuous medications     PRN medications  PRN medications: dextrose 10 % in water (D10W), dextrose, glucagon, HYDROmorphone, ondansetron, oxyCODONE      This patient has a urinary catheter   Reason for the urinary catheter remaining today? Urine catheter unnecessary, will be removed today           A/p      Ms. Lidia Krishna is a 78-year-old female with PMHx: T2DM, HTN,  "cirrhosis with Blake, esophageal varices, A-fib on Eliquis, ESRD, osteomyelitis of the back, anemia of chronic disease, thrombocytopenia.  Admitted for fall down and fracture, work up showed hemoglobin 7.9, platelets 81, x-rays  right trimalleolar fracture Ortho plan to take her to surgery 10/16.  Patient to be admitted regular admit for right trimalleolar fracture.  Resume Eliquis  Resume HD.      Pending Placement.     #. Right trimalleolar fracture s/p surgery 10/16.  #. Fall  #. G. Weakness  -Ortho consult    -Zhang  -Will hold apixaban  -Pain Tylenol scheduled, Oxy 5 every 4 as needed for pain 4-10, Dilaudid every 6 as needed breakthrough pain  -PT OT eval and treat  -Zofran as needed for nausea   Per ortho:   Orthopaedics will follow peripherally while patient is in house. Recommend:  - Pt will need to be NWB RLE until first follow up appointment.   - Patient can be continued on normal anticoagulation regimen from an orthopedics perspective, recommend at least 4 weeks  - Dressing can be removed in 7 days. Can shower after that.  - We recommend discharge with calcium(as carbonate)-VitD 600mg-400IU (10mcg) BID x30d  - Patient should follow up w/ Dr. Contreras in 3 weeks after discharge for post-operative appointment (patient may call [phone] to schedule).     #. T2DM  Neuropathy  -Hold NovoLog 70/30  10 units every morning and 8 units pm  -Mild Humalog sliding scale with meals and at bedtime  -Continue home gabapentin 100 mg every morning and 200 nightly     #. ESRD  -Dialysis at [facility] in [facility] Tuesday/Thursday/Saturday  -Continue [place]-Isidro  -Continue home midodrine 5 mg on dialysis today     #. Anemia of chronic disease  #. Thrombocytopenia  #. Liver disease Cirrhosis\"   -Hemoglobin 7.9  -Platelets 81     #. HTN  Stable not on any current medications     #. Osteomyelitis of the spine  -Completed full course of cefepime and Vanc last week  -Voltaren and lido to back     Code Status: Full  DVT ppx:  on hold " due to surgery will resume 10/18  Disp: PT/ot  DC to SNF transport 10/20 at 10 am     Spoke to my patient, Discussed the medical, social conditions, the reason for this admission explained our plan and recommendations.  Time spent on the assessment of patient, gathering and interpreting data, review of medical record/patient history, personally reviewing radiographic imaging. With greater than 50% spent in personal discussion with patient.  Time >  35 min         Dean Chavarria MD

## 2023-10-19 NOTE — PROGRESS NOTES
Subjective     Interval History: Jamee Coronel    Seen on dialysis, BP low, otherwise tolerating well.        Past Medical History:   Diagnosis Date    A-fib (CMS/HCC)     Diabetes mellitus (CMS/HCC)     Esophageal varices (CMS/HCC)     ESRD (end stage renal disease) (CMS/[facility])     Fatigue 09/21/2021    Hypertension     Impaired function of upper extremity 02/07/2020    Jaundice 09/19/2018    Liver cirrhosis secondary to GEORGE (CMS/HCC)     Lower urinary tract infectious disease 10/06/2014    Metabolic acidosis 04/20/2015    Osteomyelitis (CMS/HCC)     Osteoporosis     Platelets decreased (CMS/HCC) 09/22/2023            Current Facility-Administered Medications:     acetaminophen (Tylenol) tablet 975 mg, 975 mg, oral, q8h, Sagrario Pattersonuhn, APRN-CNP, 975 mg at 10/19/23 0011    apixaban (Eliquis) tablet 5 mg, 5 mg, oral, BID, Sagrario Pattersonuhn, APRN-CNP, 5 mg at 10/18/23 2049    B complex-vitamin C-folic acid (Nephrocaps) capsule 1 capsule, 1 capsule, oral, Daily, Sagrario Pattersonuhn, APRN-CNP, 1 capsule at 10/19/23 0631    dextrose 10 % in water (D10W) infusion, 0.3 g/kg/hr, intravenous, Once PRN, Sagrario Marquezn, APRN-CNP    dextrose 50 % injection 25 g, 25 g, intravenous, q15 min PRN, Sagrario Nascimento, APRN-CNP    diclofenac sodium (Voltaren) 1 % gel 1 Application, 4 g, Topical, BID, Sagrario Pattersonuhn, APRN-CNP, 1 Application at 10/18/23 0922    gabapentin (Neurontin) capsule 100 mg, 100 mg, oral, q AM, Sagrario HDZ Gruhn, APRN-CNP, 100 mg at 10/18/23 0920    gabapentin (Neurontin) capsule 200 mg, 200 mg, oral, Nightly, Sagrario HDZ Gruhn, APRN-CNP, 200 mg at 10/18/23 2049    glucagon (Glucagen) injection 1 mg, 1 mg, intramuscular, q15 min PRN, Sagrario Pattersonuhn, APRN-CNP    HYDROmorphone (Dilaudid) injection 0.5 mg, 0.5 mg, intravenous, q6h PRN, Sagrario Nascimento, KIN-CNP, 0.6 mg at 10/16/23 1304    insulin lispro ([place]LOG) injection 0-5 Units, 0-5 Units, subcutaneous, TID with meals, Sagrario Nascimento, KIN-ANABEL, 1 Units at  10/18/23 1623    lidocaine 4 % patch 1 patch, 1 patch, transdermal, Daily, KIN Link-CNP    lidocaine-epinephrine (Xylocaine W/EPI) 1 %-1:100,000 injection 20 mL, 20 mL, injection, Once, Sherly Cooney DO    midodrine (Proamatine) tablet 5 mg, 5 mg, oral, Once per day on Tue Thu Sat, KIN Link-CNP, 5 mg at 10/19/23 0757    nystatin (Mycostatin) 100,000 unit/gram powder 1 Application, 1 Application, Topical, TID, KIN Link-CNP, 1 Application at 10/18/23 2049    ondansetron (Zofran) injection 4 mg, 4 mg, intravenous, q6h PRN, Sagrario Nascimento APRRICARDO-CNP, 4 mg at 10/16/23 1323    oxyCODONE (Roxicodone) immediate release tablet 5 mg, 5 mg, oral, q4h PRN, KIN Link-CNP, 5 mg at 10/17/23 1201    sennosides-docusate sodium (Guillermina-Colace) 8.6-50 mg per tablet 2 tablet, 2 tablet, oral, BID, KIN Link-CNP, 2 tablet at 10/17/23 2041    Physical Exam  Physical Exam  Heart S1 S2 RRR, Lungs CTA, no edema      Vital signs in last 24 hours:  Temp:  [36.3 °C (97.3 °F)-36.6 °C (97.9 °F)] 36.3 °C (97.3 °F)  Heart Rate:  [64-71] 68  Resp:  [21] 21  BP: (100-128)/(39-43) 100/41         Labs:  Results from last 7 days   Lab Units 10/19/23  0751   WBC AUTO x10*3/uL 2.9*   RBC AUTO x10*6/uL 2.16*   HEMOGLOBIN g/dL 7.1*   HEMATOCRIT % 22.1*     Results from last 7 days   Lab Units 10/19/23  0751 10/17/23  0942   SODIUM mmol/L 138 135*   POTASSIUM mmol/L 4.0 5.1   CHLORIDE mmol/L 99 96*   CO2 mmol/L 29 27   BUN mg/dL 37* 45*   CREATININE mg/dL 5.11* 6.11*   CALCIUM mg/dL 8.2* 7.8*   PHOSPHORUS mg/dL  --  6.0*   BILIRUBIN TOTAL mg/dL 0.5  --    ALT U/L 4*  --    AST U/L 25  --               Assessment/Plan   Patient seen and examined while on dialysis, recent events, labs, medications reviewed. Will follow overall management post ankle fracture per primary team, and continue regular dialysis.  Patient reports intolerance to Epo, will get more details from outside HD unit.    Principal  Problem:    Trimalleolar fracture of ankle, closed, right, initial encounter        Greg Keyes MD  10/19/2023  10:20 AM

## 2023-10-19 NOTE — CARE PLAN
Problem: Fall/Injury  Goal: Not fall by end of shift  Outcome: Progressing  Goal: Be free from injury by end of the shift  Outcome: Progressing  Goal: Verbalize understanding of personal risk factors for fall in the hospital  Outcome: Progressing  Goal: Verbalize understanding of risk factor reduction measures to prevent injury from fall in the home  Outcome: Progressing  Goal: Use assistive devices by end of the shift  Outcome: Progressing  Goal: Pace activities to prevent fatigue by end of the shift  Outcome: Progressing     Problem: Skin  Goal: Decreased wound size/increased tissue granulation at next dressing change  Outcome: Progressing  Goal: Participates in plan/prevention/treatment measures  Outcome: Progressing  Goal: Prevent/manage excess moisture  Outcome: Progressing  Goal: Prevent/minimize sheer/friction injuries  Outcome: Progressing  Goal: Promote/optimize nutrition  Outcome: Progressing  Goal: Promote skin healing  Outcome: Progressing     Problem: Patient is at risk for or has history of falls  Goal: Patient will not sustain a fall as a result of home safety mitigation  Outcome: Progressing     Problem: Dressing Upper Extremities  Goal: STG - Patient will dress upper body  Outcome: Progressing     Problem: Grooming  Goal: STG - Patient completes grooming  Outcome: Progressing   The patient's goals for the shift include have minimal pain    The clinical goals for the shift include Pt will rate pain 0 throughout shift.

## 2023-10-19 NOTE — PROGRESS NOTES
Physical Therapy    Physical Therapy Treatment    Patient Name: Jamee Coronel  MRN: [id number]  Today's Date: 10/19/2023  Time Calculation  Start Time: 1412  Stop Time: 1435  Time Calculation (min): 23 min       Assessment/Plan   PT Assessment  PT Assessment Results: Decreased strength, Decreased range of motion, Decreased endurance, Impaired balance, Decreased mobility, Decreased coordination, Impaired judgement, Decreased safety awareness  Rehab Prognosis: Good  Evaluation/Treatment Tolerance: Patient limited by fatigue  Medical Staff Made Aware: Yes  End of Session Communication: Bedside nurse  End of Session Patient Position: Bed, 3 rail up, Alarm on     PT Plan  Treatment/Interventions: Bed mobility, Transfer training, Gait training, Stair training, Balance training, Strengthening, Endurance training, Range of motion, Therapeutic exercise, Therapeutic activity  PT Plan: Skilled PT  PT Frequency: 4 times per week  PT Discharge Recommendations: Moderate intensity level of continued care  PT Recommended Transfer Status: Assist x2  PT - OK to Discharge: Yes (indicates PT eval complete and dc rec determined)      General Visit Information:   PT  Visit  PT Received On: 10/19/23  General  Reason for Referral: fall at home s/p Trimalleolar fracture of ankle, closed, right, initial encounter, now s/p R  hind foot nail for tri mal ankle fx on 10/16  with Dr. Contrreas  Past Medical History Relevant to Rehab: T2DM, HTN, cirrhosis with Blake, esophageal varices, A-fib on Eliquis, ESRD, osteomyelitis of the back, anemia of chronic disease, thrombocytopenia  Missed Visit: Yes  Missed Visit Reason: Other (Comment)  Prior to Session Communication: Bedside nurse  Patient Position Received: Bed, 3 rail up  General Comment: pt cooperative, reports feeling tired after dialysis but agreeable to sitting EOB for ther-ex. declined standing    Subjective   Precautions:  Precautions  LE Weight Bearing Status: Right Non-Weight  Bearing  Medical Precautions: Fall precautions      Objective   Pain:  Pain Assessment  Pain Assessment: 0-10  Pain Score: 5 - Moderate pain  Pain Type: Surgical pain, Acute pain  Pain Location: Ankle  Pain Orientation: Right  Cognition:  Cognition  Overall Cognitive Status: Within Functional Limits    Activity Tolerance:  Activity Tolerance  Endurance: Tolerates 10 - 20 min exercise with multiple rests  Treatments:  Therapeutic Exercise  Therapeutic Exercise Performed: Yes  Therapeutic Exercise Activity 1: pt sat EOB and completed LLE ankle pumps and LAQs, 2 sets of 12; RLE 2 sets of LAQs x10    Therapeutic Activity  Therapeutic Activity Performed: Yes  Therapeutic Activity 1: pt sat EOB for 20 minutes with SBA, scooted along EOB with multiple rest breaks, maintained NWB RLE    Bed Mobility  Bed Mobility: Yes  Bed Mobility 1  Bed Mobility 1: Supine to sitting, Sitting to supine  Level of Assistance 1: Contact guard  Bed Mobility Comments 1: patient required mod A to boost in bed. Trendelenburg bed and pt able to use LLE to assist in boosting.  Bed Mobility 2  Bed Mobility  2: Rolling right  Level of Assistance 2: Contact guard    Transfers  Transfer:  (declined)  Transfer 1  Transfer From 1: Sit to  Transfer to 1: Stand  Technique 1: Sit to stand  Transfer Device 1: Walker  Transfer Level of Assistance 1: Dependent (x2)  Trials/Comments 1: attempted 2 stands from EOB and unable, pt with difficulty maintaining NWB RLE and decreased active attempt to stand    Outcome Measures:  [facility] Basic Mobility  Turning from your back to your side while in a flat bed without using bedrails: A little  Moving from lying on your back to sitting on the side of a flat bed without using bedrails: A little  Moving to and from bed to chair (including a wheelchair): Total  Standing up from a chair using your arms (e.g. wheelchair or bedside chair): Total  To walk in hospital room: Total  Climbing 3-5 steps with railing: Total  Basic  Mobility - Total Score: 10    Education Documentation  Precautions, taught by Mayra Rosen PT at 10/19/2023  2:57 PM.  Learner: Patient  Readiness: Acceptance  Method: Explanation  Response: Verbalizes Understanding    Mobility Training, taught by Mayra Rosen, PT at 10/19/2023  2:57 PM.  Learner: Patient  Readiness: Acceptance  Method: Explanation  Response: Verbalizes Understanding    Education Comments  No comments found.      OP EDUCATION:       Encounter Problems       Encounter Problems (Active)       Balance       STG - Maintains static standing balance with upper extremity support (Progressing)       Start:  10/17/23    Expected End:  10/31/23       INTERVENTIONS:  1. Practice standing with minimal support.  2. Educate patient about standing tolerance.  3. Educate patient about independence with gait, transfers, and ADL's.  4. Educate patient about use of assistive device.  5. Educate patient about self-directed care.         STG - Maintains dynamic sitting balance without upper extremity support (Progressing)       Start:  10/17/23    Expected End:  10/31/23       INTERVENTIONS:  1. Practice sitting on the edge of a bed/mat with minimal support.  2. Educate patient about maintining total hip precautions while maintaining balance.  3. Educate patient about pressure relief.  4. Educate patient about use of assistive device.            Mobility       STG - Patient will ambulate 10' with FWW and maintain NWB RLE  (Progressing)       Start:  10/17/23    Expected End:  11/07/23       May progress distance as appropriate             Transfers       STG - Patient will perform bed mobility indep  (Progressing)       Start:  10/17/23    Expected End:  11/07/23            STG - Patient will transfer sit to and from stand min A with LRAD and maintain NWB RLE  (Progressing)       Start:  10/17/23    Expected End:  11/07/23               Transfers       STG - Patient will perform toilet transfer (Progressing)        Start:  10/17/23    Expected End:  10/31/23            STG - Patient maintains weight bearing status during transfers (Progressing)       Start:  10/17/23    Expected End:  10/31/23

## 2023-10-20 VITALS
SYSTOLIC BLOOD PRESSURE: 119 MMHG | HEART RATE: 67 BPM | RESPIRATION RATE: 18 BRPM | OXYGEN SATURATION: 96 % | DIASTOLIC BLOOD PRESSURE: 42 MMHG | TEMPERATURE: 97.7 F | BODY MASS INDEX: 28.49 KG/M2 | WEIGHT: 177.25 LBS | HEIGHT: 66 IN

## 2023-10-20 LAB
ALBUMIN SERPL BCP-MCNC: 2.9 G/DL (ref 3.4–5)
ALP SERPL-CCNC: 111 U/L (ref 33–136)
ALT SERPL W P-5'-P-CCNC: 3 U/L (ref 7–45)
ANION GAP SERPL CALC-SCNC: 13 MMOL/L (ref 10–20)
AST SERPL W P-5'-P-CCNC: 26 U/L (ref 9–39)
BILIRUB SERPL-MCNC: 0.5 MG/DL (ref 0–1.2)
BUN SERPL-MCNC: 29 MG/DL (ref 6–23)
CALCIUM SERPL-MCNC: 8.6 MG/DL (ref 8.6–10.6)
CHLORIDE SERPL-SCNC: 97 MMOL/L (ref 98–107)
CO2 SERPL-SCNC: 33 MMOL/L (ref 21–32)
CREAT SERPL-MCNC: 3.63 MG/DL (ref 0.5–1.05)
CRP SERPL-MCNC: 6.93 MG/DL
GFR SERPL CREATININE-BSD FRML MDRD: 12 ML/MIN/1.73M*2
GLUCOSE BLD MANUAL STRIP-MCNC: 127 MG/DL (ref 74–99)
GLUCOSE SERPL-MCNC: 192 MG/DL (ref 74–99)
POTASSIUM SERPL-SCNC: 4.3 MMOL/L (ref 3.5–5.3)
PROT SERPL-MCNC: 4.8 G/DL (ref 6.4–8.2)
SODIUM SERPL-SCNC: 139 MMOL/L (ref 136–145)

## 2023-10-20 PROCEDURE — 80053 COMPREHEN METABOLIC PANEL: CPT | Mod: CMCLAB | Performed by: INTERNAL MEDICINE

## 2023-10-20 PROCEDURE — 2500000001 HC RX 250 WO HCPCS SELF ADMINISTERED DRUGS (ALT 637 FOR MEDICARE OP): Performed by: NURSE PRACTITIONER

## 2023-10-20 PROCEDURE — 82947 ASSAY GLUCOSE BLOOD QUANT: CPT

## 2023-10-20 PROCEDURE — 36415 COLL VENOUS BLD VENIPUNCTURE: CPT | Mod: CMCLAB | Performed by: INTERNAL MEDICINE

## 2023-10-20 PROCEDURE — 86140 C-REACTIVE PROTEIN: CPT | Mod: CMCLAB | Performed by: INTERNAL MEDICINE

## 2023-10-20 RX ORDER — INSULIN ASPART 100 [IU]/ML
6 INJECTION, SUSPENSION SUBCUTANEOUS EVERY MORNING
Start: 2023-10-20

## 2023-10-20 RX ORDER — INSULIN ASPART 100 [IU]/ML
6 INJECTION, SUSPENSION SUBCUTANEOUS EVERY EVENING
Start: 2023-10-20

## 2023-10-20 RX ADMIN — GABAPENTIN 100 MG: 100 CAPSULE ORAL at 08:15

## 2023-10-20 RX ADMIN — APIXABAN 5 MG: 5 TABLET, FILM COATED ORAL at 08:15

## 2023-10-20 RX ADMIN — DICLOFENAC SODIUM 1 APPLICATION: 10 GEL TOPICAL at 08:15

## 2023-10-20 RX ADMIN — SENNOSIDES AND DOCUSATE SODIUM 2 TABLET: 50; 8.6 TABLET ORAL at 08:15

## 2023-10-20 RX ADMIN — ACETAMINOPHEN 975 MG: 325 TABLET ORAL at 08:15

## 2023-10-20 RX ADMIN — ASCORBIC ACID, THIAMINE MONONITRATE,RIBOFLAVIN, NIACINAMIDE, PYRIDOXINE HYDROCHLORIDE, FOLIC ACID, CYANOCOBALAMIN, BIOTIN, CALCIUM PANTOTHENATE, 1 CAPSULE: 100; 1.5; 1.7; 20; 10; 1; 6000; 150000; 5 CAPSULE, LIQUID FILLED ORAL at 06:45

## 2023-10-20 RX ADMIN — NYSTATIN 1 APPLICATION: 100000 POWDER TOPICAL at 08:15

## 2023-10-20 ASSESSMENT — COGNITIVE AND FUNCTIONAL STATUS - GENERAL
DRESSING REGULAR LOWER BODY CLOTHING: TOTAL
PERSONAL GROOMING: A LITTLE
STANDING UP FROM CHAIR USING ARMS: TOTAL
MOBILITY SCORE: 10
MOVING FROM LYING ON BACK TO SITTING ON SIDE OF FLAT BED WITH BEDRAILS: A LITTLE
CLIMB 3 TO 5 STEPS WITH RAILING: TOTAL
MOVING TO AND FROM BED TO CHAIR: TOTAL
EATING MEALS: A LITTLE
DRESSING REGULAR UPPER BODY CLOTHING: A LITTLE
TURNING FROM BACK TO SIDE WHILE IN FLAT BAD: A LITTLE
WALKING IN HOSPITAL ROOM: TOTAL
TOILETING: A LITTLE
HELP NEEDED FOR BATHING: A LITTLE
DAILY ACTIVITIY SCORE: 16

## 2023-10-20 ASSESSMENT — PAIN DESCRIPTION - DESCRIPTORS: DESCRIPTORS: ACHING

## 2023-10-20 ASSESSMENT — PAIN SCALES - GENERAL
PAINLEVEL_OUTOF10: 0 - NO PAIN
PAINLEVEL_OUTOF10: 4

## 2023-10-20 ASSESSMENT — PAIN - FUNCTIONAL ASSESSMENT: PAIN_FUNCTIONAL_ASSESSMENT: 0-10

## 2023-10-20 NOTE — PROGRESS NOTES
Physical Therapy                 Therapy Communication Note    Patient Name: Jamee Coronel  MRN: [id number]  Today's Date: 10/20/2023     Discipline: Physical Therapy    Missed Visit Reason: Missed Visit Reason: Other (Comment)    Missed Time: Attempt    Comment:  PT treatment attempted; patient declined reporting fatigue and pain. Pt set for dc today. Will re-attempt if pt remains admitted.

## 2023-10-20 NOTE — CARE PLAN
The patient's goals for the shift include have minimal pain    The clinical goals for the shift include Patient will be HDS  while in the hospital      Problem: Fall/Injury  Goal: Not fall by end of shift  Outcome: Met  Goal: Be free from injury by end of the shift  Outcome: Met  Goal: Verbalize understanding of personal risk factors for fall in the hospital  Outcome: Met  Goal: Verbalize understanding of risk factor reduction measures to prevent injury from fall in the home  Outcome: Met  Goal: Use assistive devices by end of the shift  Outcome: Met  Goal: Pace activities to prevent fatigue by end of the shift  Outcome: Met     Problem: Skin  Goal: Decreased wound size/increased tissue granulation at next dressing change  Outcome: Met  Goal: Participates in plan/prevention/treatment measures  Outcome: Met  Goal: Prevent/manage excess moisture  Outcome: Met  Goal: Prevent/minimize sheer/friction injuries  Outcome: Met  Goal: Promote/optimize nutrition  Outcome: Met  Goal: Promote skin healing  Outcome: Met     Problem: Patient is at risk for or has history of falls  Goal: Patient will not sustain a fall as a result of home safety mitigation  Outcome: Met     Problem: Dressing Upper Extremities  Goal: STG - Patient will dress upper body  Outcome: Met

## 2023-10-20 NOTE — HOSPITAL COURSE
"Ms. Lidia Krishna is a 78-year-old female with PMHx: T2DM, HTN, cirrhosis with Blake, esophageal varices, A-fib on Eliquis, ESRD, osteomyelitis of the back, anemia of chronic disease, thrombocytopenia.  Admitted for fall down and fracture, work up showed hemoglobin 7.9, platelets 81, x-rays  right trimalleolar fracture Ortho plan to take her to surgery 10/16.  Patient to be admitted regular admit for right trimalleolar fracture.  Resume Eliquis  Resume HD.       Pending Placement.      #. Right trimalleolar fracture s/p surgery 10/16.  #. Fall  #. G. Weakness  -Ortho consult    -Zhang  -Will hold apixaban  -Pain Tylenol scheduled, Oxy 5 every 4 as needed for pain 4-10, Dilaudid every 6 as needed breakthrough pain  -PT OT eval and treat  -Zofran as needed for nausea   Per ortho:   Orthopaedics will follow peripherally while patient is in house. Recommend:  - Pt will need to be NWB RLE until first follow up appointment.   - Patient can be continued on normal anticoagulation regimen from an orthopedics perspective, recommend at least 4 weeks  - Dressing can be removed in 7 days. Can shower after that.  - We recommend discharge with calcium(as carbonate)-VitD 600mg-400IU (10mcg) BID x30d  - Patient should follow up w/ Dr. Contreras in 3 weeks after discharge for post-operative appointment (patient may call [phone] to schedule).      #. T2DM  Neuropathy  -Hold NovoLog 70/30  10 units every morning and 8 units pm  -Mild Humalog sliding scale with meals and at bedtime  -Continue home gabapentin 100 mg every morning and 200 nightly     #. ESRD  -Dialysis at [facility] in [place] Tuesday/Thursday/Saturday  -Continue [place]-Isidro  -Continue home midodrine 5 mg on dialysis today     #. Anemia of chronic disease  #. Thrombocytopenia  #. Liver disease Cirrhosis\"   -Hemoglobin 7.9  -Platelets 81     #. HTN  Stable not on any current medications     #. Osteomyelitis of the spine  -Completed full course of cefepime and Vanc last " week  -Voltaren and lido to back

## 2023-10-23 ENCOUNTER — NURSING HOME VISIT (OUTPATIENT)
Dept: POST ACUTE CARE | Facility: EXTERNAL LOCATION | Age: 78
End: 2023-10-23
Payer: MEDICARE

## 2023-10-23 DIAGNOSIS — I10 ESSENTIAL HYPERTENSION: ICD-10-CM

## 2023-10-23 DIAGNOSIS — I48.91 ATRIAL FIBRILLATION, UNSPECIFIED TYPE (MULTI): ICD-10-CM

## 2023-10-23 DIAGNOSIS — R53.81 PHYSICAL DECONDITIONING: ICD-10-CM

## 2023-10-23 DIAGNOSIS — D63.8 ANEMIA OF CHRONIC DISEASE: ICD-10-CM

## 2023-10-23 DIAGNOSIS — S82.851A TRIMALLEOLAR FRACTURE OF ANKLE, CLOSED, RIGHT, INITIAL ENCOUNTER: Primary | ICD-10-CM

## 2023-10-23 DIAGNOSIS — D61.818 PANCYTOPENIA (MULTI): ICD-10-CM

## 2023-10-23 DIAGNOSIS — K74.60 LIVER CIRRHOSIS SECONDARY TO NASH (MULTI): ICD-10-CM

## 2023-10-23 DIAGNOSIS — G62.9 NEUROPATHY: ICD-10-CM

## 2023-10-23 DIAGNOSIS — K75.81 LIVER CIRRHOSIS SECONDARY TO NASH (MULTI): ICD-10-CM

## 2023-10-23 DIAGNOSIS — E11.21 TYPE 2 DIABETES MELLITUS WITH NEPHROPATHY (MULTI): ICD-10-CM

## 2023-10-23 DIAGNOSIS — N18.6 ESRD (END STAGE RENAL DISEASE) ON DIALYSIS (MULTI): ICD-10-CM

## 2023-10-23 DIAGNOSIS — Z99.2 ESRD (END STAGE RENAL DISEASE) ON DIALYSIS (MULTI): ICD-10-CM

## 2023-10-23 DIAGNOSIS — M46.20 OSTEOMYELITIS OF SPINE (MULTI): ICD-10-CM

## 2023-10-23 PROCEDURE — 99310 SBSQ NF CARE HIGH MDM 45: CPT | Performed by: FAMILY MEDICINE

## 2023-10-23 NOTE — LETTER
Patient: Jamee Coronel  : 1945    Encounter Date: 10/23/2023    Nursing Home Visit  Name: Jamee Coronel  YOB: 1945  MRN: [id number]    Chief Complaint  Follow up on new patient  Subjective  HPI:   Ms. Lidia Krishna is a 78-year-old female with PMHx: T2DM, HTN, cirrhosis with Blake, esophageal varices, A-fib on Eliquis, ESRD, osteomyelitis of the back, anemia of chronic disease, thrombocytopenia.  Presented to the ED today after falling at home states she was trying to get into bed and she was too weak and fell on the ground tried to correct herself and her legs hit the chair denies any head injury or loss of consciousness.  Patient just completed antibiotic therapy for osteomyelitis of her back on her last dialysis day.  Patient states since she was diagnosed with the osteomyelitis she has been feeling weak and having difficulty ambulating and getting around. While in the ED her hemoglobin was found to be 7.9 she does have a history of chronic anemia.  Her platelets were 81 she also has a history of thrombocytopenia.  Mainly her x-rays were negative but she was found to have a right trimalleolar fracture Ortho saw her down in the emergency room took her to surgery the next morning.  She is on dialysis .  Hospital course uneventful and was discharged to SNF on 10/20  -----10/23   Patient is lying in bed she reports her legs hurt.  She points to her lateral thighs. Appetite fair to good.      Review of Systems:  Reviewed chart looking at current medications, treatment, labs and x-rays and note pertinent positives or negatives, otherwise 10 point system review negative except for what is noted in HPI.    Objective  VS: 113/65, 98.0, 71, 18, 97%    Physical exam:   Physical Exam  Vitals and nursing note reviewed.   Constitutional:       General: She is awake.      Appearance: Normal appearance.   HENT:      Head: Normocephalic and atraumatic.      Nose: Nose normal.      Mouth/Throat:       Mouth: Mucous membranes are moist.      Pharynx: Oropharynx is clear.   Cardiovascular:      Rate and Rhythm: Normal rate and regular rhythm.      Pulses: Normal pulses.      Heart sounds: Normal heart sounds.   Pulmonary:      Effort: Pulmonary effort is normal.      Breath sounds: Normal breath sounds.   Abdominal:      General: Abdomen is flat. Bowel sounds are normal. There is no distension.      Palpations: Abdomen is soft.      Tenderness: There is no abdominal tenderness.   Musculoskeletal:      Comments: Minimal edema RLE   Neurological:      Mental Status: She is alert.   Psychiatric:         Behavior: Behavior is cooperative.        Assessment/Plan   78-year-old female with PMHx: T2DM, HTN, cirrhosis with Blake, esophageal varices, A-fib on Eliquis, ESRD, osteomyelitis of the back, anemia of chronic disease, thrombocytopenia.  Presented to the ED today after falling at home states she was trying to get into bed and she was too weak and fell on the ground tried to correct herself and her legs hit the chair.     Trimalleolar fracture of ankle, closed, right, initial encounter  Patient with cast and ace wrap in place. Monitor toes for any changes. F/u with surgeon 3 weeks after discharge from hospital.   Schedule with Dr. Contreras  [phone] to schedule appt.    -Patient to be NWB RLE until first follow up appointment.   -Patient can be continued on normal anticoagulation regimen from an orthopedics perspective, recommend at least 4 weeks      Physical deconditioning   For PT/OT for mobility, gait training, endurance, safety awareness, ADLs, and assessment of post-rehab needs.    Type 2 diabetes mellitus with nephropathy (CMS/[facility])  C/w NovoLOG Mix 70/30-- 10u/qam          INSULIN ASPART PROT-ASP 70-30 8u/q afternoon    Neuropathy  C/w Gabapentin    ESRD (end stage renal disease) on dialysis (CMS/[facility])  -Dialysis at [facility] in [facility] Tuesday/Thursday/Saturday  -Continue [place]-Isidro  -Continue home midodrine 5  mg on dialysis days    Anemia of chronic disease  Pancytopenic-  WBC- 3.01, RBC- 2.25, HGB- 7.5, HCT- 23.5, PLTS- 90- today, 10/23    Atrial fibrillation (CMS/HCC)  C/w Eliquis    Essential hypertension  Not on current medications at present BP is stable    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 Assessment: WBC = 3.7, Hgb/Hct = 11.2/34.6, platelet count = 90k on 5/15/2023    Electronically Signed By: KIN Albrecht-CNP   10/25/23  1:40 PM

## 2023-10-24 NOTE — PROGRESS NOTES
Nursing Home Visit  Name: Jamee Coronel  YOB: 1945  MRN: [id number]    Chief Complaint  Follow up on new patient  Subjective  HPI:   Ms. Lidia Krishna is a 78-year-old female with PMHx: T2DM, HTN, cirrhosis with Blake, esophageal varices, A-fib on Eliquis, ESRD, osteomyelitis of the back, anemia of chronic disease, thrombocytopenia.  Presented to the ED today after falling at home states she was trying to get into bed and she was too weak and fell on the ground tried to correct herself and her legs hit the chair denies any head injury or loss of consciousness.  Patient just completed antibiotic therapy for osteomyelitis of her back on her last dialysis day.  Patient states since she was diagnosed with the osteomyelitis she has been feeling weak and having difficulty ambulating and getting around. While in the ED her hemoglobin was found to be 7.9 she does have a history of chronic anemia.  Her platelets were 81 she also has a history of thrombocytopenia.  Mainly her x-rays were negative but she was found to have a right trimalleolar fracture Ortho saw her down in the emergency room took her to surgery the next morning.  She is on dialysis [facility].  Hospital course uneventful and was discharged to SNF on 10/20  -----10/23   Patient is lying in bed she reports her legs hurt.  She points to her lateral thighs. Appetite fair to good.      Review of Systems:  Reviewed chart looking at current medications, treatment, labs and x-rays and note pertinent positives or negatives, otherwise 10 point system review negative except for what is noted in HPI.    Objective  VS: 113/65, 98.0, 71, 18, 97%    Physical exam:   Physical Exam  Vitals and nursing note reviewed.   Constitutional:       General: She is awake.      Appearance: Normal appearance.   HENT:      Head: Normocephalic and atraumatic.      Nose: Nose normal.      Mouth/Throat:      Mouth: Mucous membranes are moist.      Pharynx: Oropharynx is clear.    Cardiovascular:      Rate and Rhythm: Normal rate and regular rhythm.      Pulses: Normal pulses.      Heart sounds: Normal heart sounds.   Pulmonary:      Effort: Pulmonary effort is normal.      Breath sounds: Normal breath sounds.   Abdominal:      General: Abdomen is flat. Bowel sounds are normal. There is no distension.      Palpations: Abdomen is soft.      Tenderness: There is no abdominal tenderness.   Musculoskeletal:      Comments: Minimal edema RLE   Neurological:      Mental Status: She is alert.   Psychiatric:         Behavior: Behavior is cooperative.        Assessment/Plan   78-year-old female with PMHx: T2DM, HTN, cirrhosis with Blake, esophageal varices, A-fib on Eliquis, ESRD, osteomyelitis of the back, anemia of chronic disease, thrombocytopenia.  Presented to the ED today after falling at home states she was trying to get into bed and she was too weak and fell on the ground tried to correct herself and her legs hit the chair.     Trimalleolar fracture of ankle, closed, right, initial encounter  Patient with cast and ace wrap in place. Monitor toes for any changes. F/u with surgeon 3 weeks after discharge from hospital.   Schedule with Dr. Contreras  [phone] to schedule appt.    -Patient to be NWB RLE until first follow up appointment.   -Patient can be continued on normal anticoagulation regimen from an orthopedics perspective, recommend at least 4 weeks      Physical deconditioning   For PT/OT for mobility, gait training, endurance, safety awareness, ADLs, and assessment of post-rehab needs.    Type 2 diabetes mellitus with nephropathy (CMS/HCC)  C/w NovoLOG Mix 70/30-- 10u/qam          INSULIN ASPART PROT-ASP 70-30 8u/q afternoon    Neuropathy  C/w Gabapentin    ESRD (end stage renal disease) on dialysis (CMS/HCC)  -Dialysis at [facility] Tuesday/Thursday/Saturday  -Continue [place]-Isidro  -Continue home midodrine 5 mg on dialysis days    Anemia of chronic disease  Pancytopenic-  WBC-  3.01, RBC- 2.25, HGB- 7.5, HCT- 23.5, PLTS- 90- today, 10/23    Atrial fibrillation (CMS/HCC)  C/w Eliquis    Essential hypertension  Not on current medications at present BP is stable    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 Assessment: WBC = 3.7, Hgb/Hct = 11.2/34.6, platelet count = 90k on 5/15/2023

## 2023-10-25 PROBLEM — D63.8 ANEMIA OF CHRONIC DISEASE: Status: ACTIVE | Noted: 2023-10-25

## 2023-10-25 PROBLEM — G62.9 NEUROPATHY: Status: ACTIVE | Noted: 2023-10-25

## 2023-10-25 PROBLEM — R53.81 PHYSICAL DECONDITIONING: Status: ACTIVE | Noted: 2023-10-25

## 2023-10-25 NOTE — ASSESSMENT & PLAN NOTE
10/23 Pancytopenic-  WBC- 3.01, RBC- 2.25, HGB- 7.5, HCT- 23.5, PLTS- 90k    Last Assessment & Plan: Formatting of this note might be different from the original. Assessment: WBC = 3.7, Hgb/Hct = 11.2/34.6, platelet count = 90k on 5/15/2023

## 2023-10-25 NOTE — ASSESSMENT & PLAN NOTE
-Dialysis at [facility] in [facility] Tuesday/Thursday/Saturday  -Continue [place]-Isidro  -Continue home midodrine 5 mg on dialysis days

## 2023-10-25 NOTE — ASSESSMENT & PLAN NOTE
Patient with cast and ace wrap in place. Monitor toes for any changes. F/u with surgeon 3 weeks after discharge from hospital.   Schedule with Dr. Contreras  [phone] to schedule appt.    -Patient to be NWB RLE until first follow up appointment.   -Patient can be continued on normal anticoagulation regimen from an orthopedics perspective, recommend at least 4 weeks

## 2023-10-25 NOTE — ASSESSMENT & PLAN NOTE
For PT/OT for mobility, gait training, endurance, safety awareness, ADLs, and assessment of post-rehab needs.

## 2023-10-30 ENCOUNTER — NURSING HOME VISIT (OUTPATIENT)
Dept: POST ACUTE CARE | Facility: EXTERNAL LOCATION | Age: 78
End: 2023-10-30
Payer: MEDICARE

## 2023-10-30 DIAGNOSIS — E11.21 TYPE 2 DIABETES MELLITUS WITH NEPHROPATHY (MULTI): ICD-10-CM

## 2023-10-30 DIAGNOSIS — N18.6 ESRD (END STAGE RENAL DISEASE) ON DIALYSIS (MULTI): ICD-10-CM

## 2023-10-30 DIAGNOSIS — K74.60 LIVER CIRRHOSIS SECONDARY TO NASH (MULTI): ICD-10-CM

## 2023-10-30 DIAGNOSIS — Z99.2 ESRD (END STAGE RENAL DISEASE) ON DIALYSIS (MULTI): ICD-10-CM

## 2023-10-30 DIAGNOSIS — R53.81 PHYSICAL DECONDITIONING: ICD-10-CM

## 2023-10-30 DIAGNOSIS — S82.851A TRIMALLEOLAR FRACTURE OF ANKLE, CLOSED, RIGHT, INITIAL ENCOUNTER: Primary | ICD-10-CM

## 2023-10-30 DIAGNOSIS — I10 ESSENTIAL HYPERTENSION: ICD-10-CM

## 2023-10-30 DIAGNOSIS — D63.8 ANEMIA OF CHRONIC DISEASE: ICD-10-CM

## 2023-10-30 DIAGNOSIS — G62.9 NEUROPATHY: ICD-10-CM

## 2023-10-30 DIAGNOSIS — M46.20 OSTEOMYELITIS OF SPINE (MULTI): ICD-10-CM

## 2023-10-30 DIAGNOSIS — K75.81 LIVER CIRRHOSIS SECONDARY TO NASH (MULTI): ICD-10-CM

## 2023-10-30 PROCEDURE — 99309 SBSQ NF CARE MODERATE MDM 30: CPT | Performed by: FAMILY MEDICINE

## 2023-10-30 NOTE — LETTER
Patient: Jamee Coronel  : 1945    Encounter Date: 10/30/2023    Nursing Home Visit  Name: Jamee Coronel  YOB: 1945  MRN: [id number]    Chief Complaint  Right ankle fracture  Subjective  HPI:   Ms. Lidia Krishna is a 78-year-old female with PMHx: T2DM, HTN, cirrhosis with Blake, esophageal varices, A-fib on Eliquis, ESRD, osteomyelitis of the back, anemia of chronic disease, thrombocytopenia.  Presented to the ED today after falling at home states she was trying to get into bed and she was too weak and fell on the ground tried to correct herself and her legs hit the chair denies any head injury or loss of consciousness.  Patient just completed antibiotic therapy for osteomyelitis of her back on her last dialysis day.  Patient states since she was diagnosed with the osteomyelitis she has been feeling weak and having difficulty ambulating and getting around. While in the ED her hemoglobin was found to be 7.9 she does have a history of chronic anemia.  Her platelets were 81 she also has a history of thrombocytopenia.  Mainly her x-rays were negative but she was found to have a right trimalleolar fracture Ortho saw her down in the emergency room took her to surgery the next morning.  She is on dialysis .  Hospital course uneventful and was discharged to SNF on 10/20  -----10/23   Patient is lying in bed she reports her legs hurt.  She points to her lateral thighs. Appetite fair to good.   -----10/30  Patient sitting wheelchair, is upset, she wants to return to bed, she does not like the way her dressing is wrapped on her ankle, she is having pain in her foot, she does not like the food.  She is overall irritable.  Appetite variable, mainly % meals consumed  Denies HA, dizziness, SOB, CP, N&V or constipation      Review of Systems:  Reviewed chart looking at current medications, treatment, labs and x-rays and note pertinent positives or negatives, otherwise 10 point system review  negative except for what is noted in HPI.    Objective  VS: 100/52, 96.9z, 71, 19, 97%, 174#    Physical exam:   Physical Exam  Vitals and nursing note reviewed.   Constitutional:       General: She is awake.      Appearance: Normal appearance.   HENT:      Head: Normocephalic and atraumatic.      Nose: Nose normal.      Mouth/Throat:      Mouth: Mucous membranes are moist.      Pharynx: Oropharynx is clear.   Cardiovascular:      Rate and Rhythm: Normal rate and regular rhythm.      Pulses: Normal pulses.      Heart sounds: Normal heart sounds.   Pulmonary:      Effort: Pulmonary effort is normal.      Breath sounds: Normal breath sounds.   Abdominal:      General: Abdomen is flat. Bowel sounds are normal. There is no distension.      Palpations: Abdomen is soft.      Tenderness: There is no abdominal tenderness.   Musculoskeletal:      Comments: Minimal edema RLE   Neurological:      Mental Status: She is alert.   Psychiatric:         Behavior: Behavior is cooperative.        Assessment/Plan   78-year-old female with PMHx: T2DM, HTN, cirrhosis with Blake, esophageal varices, A-fib on Eliquis, ESRD, osteomyelitis of the back, anemia of chronic disease, thrombocytopenia.  Presented to the ED today after falling at home states she was trying to get into bed and she was too weak and fell on the ground tried to correct herself and her legs hit the chair.     Trimalleolar fracture of ankle, closed, right, initial encounter  Patient with cast and ace wrap in place. Monitor toes for any changes. F/u with surgeon 3 weeks after discharge from hospital.   Schedule with Dr. Contreras  [phone] to schedule appt.    -Patient to be NWB RLE until first follow up appointment.   -Patient can be continued on normal anticoagulation regimen from an orthopedics perspective, recommend at least 4 weeks      Physical deconditioning   For PT/OT for mobility, gait training, endurance, safety awareness, ADLs, and assessment of post-rehab  needs.    Type 2 diabetes mellitus with nephropathy (CMS/[facility])  C/w NovoLOG Mix 70/30-- 10u/qam INSULIN ASPART PROT-ASP 70-30 8u/q afternoon.  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

## 2023-10-31 NOTE — ASSESSMENT & PLAN NOTE
Not on current medications at present BP is stable   Where Do You Want The Question To Include Opioid Counseling Located?: Case Summary Tab

## 2023-10-31 NOTE — ASSESSMENT & PLAN NOTE
-Dialysis at [facility] in [facility] Tuesday/Thursday/Saturday  -Continue [place]-Isidro  -Continue home midodrine 5 mg on dialysis days

## 2023-10-31 NOTE — ASSESSMENT & PLAN NOTE
Patient with cast and ace wrap in place. Monitor toes for any changes. F/u with surgeon 3 weeks after discharge from hospital.   Schedule with Dr. Contreras  [phone] to schedule appt.    -Patient to be NWB RLE until first follow up appointment.   -Patient can be continued on normal anticoagulation regimen from an orthopedics perspective, recommend at least 4 weeks

## 2023-10-31 NOTE — PROGRESS NOTES
Nursing Home Visit  Name: Jamee Coronel  YOB: 1945  MRN: [id number]    Chief Complaint  Right ankle fracture  Subjective  HPI:   Ms. Lidia Krishna is a 78-year-old female with PMHx: T2DM, HTN, cirrhosis with Blake, esophageal varices, A-fib on Eliquis, ESRD, osteomyelitis of the back, anemia of chronic disease, thrombocytopenia.  Presented to the ED today after falling at home states she was trying to get into bed and she was too weak and fell on the ground tried to correct herself and her legs hit the chair denies any head injury or loss of consciousness.  Patient just completed antibiotic therapy for osteomyelitis of her back on her last dialysis day.  Patient states since she was diagnosed with the osteomyelitis she has been feeling weak and having difficulty ambulating and getting around. While in the ED her hemoglobin was found to be 7.9 she does have a history of chronic anemia.  Her platelets were 81 she also has a history of thrombocytopenia.  Mainly her x-rays were negative but she was found to have a right trimalleolar fracture Ortho saw her down in the emergency room took her to surgery the next morning.  She is on dialysis T-TH-SA.  Hospital course uneventful and was discharged to SNF on 10/20  -----10/23   Patient is lying in bed she reports her legs hurt.  She points to her lateral thighs. Appetite fair to good.   -----10/30  Patient sitting wheelchair, is upset, she wants to return to bed, she does not like the way her dressing is wrapped on her ankle, she is having pain in her foot, she does not like the food.  She is overall irritable.  Appetite variable, mainly % meals consumed  Denies HA, dizziness, SOB, CP, N&V or constipation      Review of Systems:  Reviewed chart looking at current medications, treatment, labs and x-rays and note pertinent positives or negatives, otherwise 10 point system review negative except for what is noted in HPI.    Objective  VS: 100/52, 96.9z, 71,  19, 97%, 174#    Physical exam:   Physical Exam  Vitals and nursing note reviewed.   Constitutional:       General: She is awake.      Appearance: Normal appearance.   HENT:      Head: Normocephalic and atraumatic.      Nose: Nose normal.      Mouth/Throat:      Mouth: Mucous membranes are moist.      Pharynx: Oropharynx is clear.   Cardiovascular:      Rate and Rhythm: Normal rate and regular rhythm.      Pulses: Normal pulses.      Heart sounds: Normal heart sounds.   Pulmonary:      Effort: Pulmonary effort is normal.      Breath sounds: Normal breath sounds.   Abdominal:      General: Abdomen is flat. Bowel sounds are normal. There is no distension.      Palpations: Abdomen is soft.      Tenderness: There is no abdominal tenderness.   Musculoskeletal:      Comments: Minimal edema RLE   Neurological:      Mental Status: She is alert.   Psychiatric:         Behavior: Behavior is cooperative.        Assessment/Plan   78-year-old female with PMHx: T2DM, HTN, cirrhosis with Blake, esophageal varices, A-fib on Eliquis, ESRD, osteomyelitis of the back, anemia of chronic disease, thrombocytopenia.  Presented to the ED today after falling at home states she was trying to get into bed and she was too weak and fell on the ground tried to correct herself and her legs hit the chair.     Trimalleolar fracture of ankle, closed, right, initial encounter  Patient with cast and ace wrap in place. Monitor toes for any changes. F/u with surgeon 3 weeks after discharge from hospital.   Schedule with Dr. Contreras  [phone] to schedule appt.    -Patient to be NWB RLE until first follow up appointment.   -Patient can be continued on normal anticoagulation regimen from an orthopedics perspective, recommend at least 4 weeks      Physical deconditioning   For PT/OT for mobility, gait training, endurance, safety awareness, ADLs, and assessment of post-rehab needs.    Type 2 diabetes mellitus with nephropathy (CMS/HCC)  C/w NovoLOG Mix  70/30-- 10u/qam INSULIN ASPART PROT-ASP 70-30 8u/q afternoon.  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

## 2023-11-02 ENCOUNTER — NURSING HOME VISIT (OUTPATIENT)
Dept: POST ACUTE CARE | Facility: EXTERNAL LOCATION | Age: 78
End: 2023-11-02
Payer: MEDICARE

## 2023-11-02 DIAGNOSIS — Z99.2 ESRD (END STAGE RENAL DISEASE) ON DIALYSIS (MULTI): ICD-10-CM

## 2023-11-02 DIAGNOSIS — D63.8 ANEMIA OF CHRONIC DISEASE: ICD-10-CM

## 2023-11-02 DIAGNOSIS — S82.851A TRIMALLEOLAR FRACTURE OF ANKLE, CLOSED, RIGHT, INITIAL ENCOUNTER: Primary | ICD-10-CM

## 2023-11-02 DIAGNOSIS — E11.21 TYPE 2 DIABETES MELLITUS WITH NEPHROPATHY (MULTI): ICD-10-CM

## 2023-11-02 DIAGNOSIS — M46.20 OSTEOMYELITIS OF SPINE (MULTI): ICD-10-CM

## 2023-11-02 DIAGNOSIS — I48.91 ATRIAL FIBRILLATION, UNSPECIFIED TYPE (MULTI): ICD-10-CM

## 2023-11-02 DIAGNOSIS — K75.81 LIVER CIRRHOSIS SECONDARY TO NASH (MULTI): ICD-10-CM

## 2023-11-02 DIAGNOSIS — N18.6 ESRD (END STAGE RENAL DISEASE) ON DIALYSIS (MULTI): ICD-10-CM

## 2023-11-02 DIAGNOSIS — K74.60 LIVER CIRRHOSIS SECONDARY TO NASH (MULTI): ICD-10-CM

## 2023-11-02 DIAGNOSIS — I10 ESSENTIAL HYPERTENSION: ICD-10-CM

## 2023-11-02 DIAGNOSIS — D61.818 PANCYTOPENIA (MULTI): ICD-10-CM

## 2023-11-02 DIAGNOSIS — R53.81 PHYSICAL DECONDITIONING: ICD-10-CM

## 2023-11-02 DIAGNOSIS — G62.9 NEUROPATHY: ICD-10-CM

## 2023-11-02 PROCEDURE — 99308 SBSQ NF CARE LOW MDM 20: CPT | Performed by: FAMILY MEDICINE

## 2023-11-02 NOTE — LETTER
Patient: Jamee Coronel  : 1945    Encounter Date: 2023    Nursing Home Visit  Name: Jamee Coronel  YOB: 1945  MRN: [id number]    Chief Complaint  Right ankle fracture  Subjective  HPI:   Ms. Lidia Krishna is a 78-year-old female with PMHx: T2DM, HTN, cirrhosis with Blake, esophageal varices, A-fib on Eliquis, ESRD, osteomyelitis of the back, anemia of chronic disease, thrombocytopenia.  Presented to the ED today after falling at home states she was trying to get into bed and she was too weak and fell on the ground tried to correct herself and her legs hit the chair denies any head injury or loss of consciousness.  Patient just completed antibiotic therapy for osteomyelitis of her back on her last dialysis day.  Patient states since she was diagnosed with the osteomyelitis she has been feeling weak and having difficulty ambulating and getting around. While in the ED her hemoglobin was found to be 7.9 she does have a history of chronic anemia.  Her platelets were 81 she also has a history of thrombocytopenia.  Mainly her x-rays were negative but she was found to have a right trimalleolar fracture Ortho saw her down in the emergency room took her to surgery the next morning.  She is on dialysis .  Hospital course uneventful and was discharged to SNF on 10/20  -----10/23   Patient is lying in bed she reports her legs hurt.  She points to her lateral thighs. Appetite fair to good.   -----10/30  Patient sitting wheelchair, is upset, she wants to return to bed, she does not like the way her dressing is wrapped on her ankle, she is having pain in her foot, she does not like the food.  She is overall irritable.  Appetite variable, mainly % meals consumed  Denies HA, dizziness, SOB, CP, N&V or constipation  ----  Dialysis today, she feels fatigued.  She denies pain or discomfort. Right ankle cast intact. Appetite fair.  Denies HA, dizziness, SOB, CP, N&V or constipation.      Review of Systems:  Reviewed chart looking at current medications, treatment, labs and x-rays and note pertinent positives or negatives, otherwise 10 point system review negative except for what is noted in HPI.    Objective  VS: 117/59, 97.3, 70, 18, 95%, 174#    Physical exam:   Physical Exam  Vitals and nursing note reviewed.   Constitutional:       General: She is awake.      Appearance: Normal appearance.   HENT:      Head: Normocephalic and atraumatic.      Nose: Nose normal.      Mouth/Throat:      Mouth: Mucous membranes are moist.      Pharynx: Oropharynx is clear.   Cardiovascular:      Rate and Rhythm: Normal rate and regular rhythm.      Pulses: Normal pulses.      Heart sounds: Normal heart sounds.   Pulmonary:      Effort: Pulmonary effort is normal.      Breath sounds: Normal breath sounds.   Abdominal:      General: Abdomen is flat. Bowel sounds are normal. There is no distension.      Palpations: Abdomen is soft.      Tenderness: There is no abdominal tenderness.   Musculoskeletal:      Comments: Minimal edema RLE.  Right LE cast intact   Neurological:      Mental Status: She is alert.   Psychiatric:         Behavior: Behavior is cooperative.        Assessment/Plan   78-year-old female with PMHx: T2DM, HTN, cirrhosis with Blake, esophageal varices, A-fib on Eliquis, ESRD, osteomyelitis of the back, anemia of chronic disease, thrombocytopenia.  Presented to the ED today after falling at home states she was trying to get into bed and she was too weak and fell on the ground tried to correct herself and her legs hit the chair.     Trimalleolar fracture of ankle, closed, right, initial encounter  Patient with cast and ace wrap in place. Monitor toes for any changes. F/u with surgeon 3 weeks after discharge from hospital.   Schedule with Dr. Contreras  [phone] to schedule appt.  Will need to see ortho next week.  Will let  know and order for nursing   -Patient to be NWB RLE until first  follow up appointment.   -Patient can be continued on normal anticoagulation regimen from an orthopedics perspective, recommend at least 4 weeks     Physical deconditioning   For PT/OT for mobility, gait training, endurance, safety awareness, ADLs, and assessment of post-rehab needs.     Type 2 diabetes mellitus with nephropathy (CMS/HCC)  C/w NovoLOG Mix 70/30-- 10u/qam INSULIN ASPART PROT-ASP 70-30 8u/q afternoon.  BS stable low 100s     Neuropathy  C/w Gabapentin     ESRD (end stage renal disease) on dialysis (CMS/[facility])  -Dialysis at [facility] in [facility] Tuesday/Thursday/Saturday  -Continue [place]-Isidro  -Continue home midodrine 5 mg on dialysis days     Anemia of chronic disease/Pancytopenia  Pancytopenic-  WBC- 3.58, RBC- 2.26, HGB- 7.6, HCT- 23.9 PLTS- 104- today, 10/30. 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

## 2023-11-03 NOTE — PROGRESS NOTES
Nursing Home Visit  Name: Jamee Coronel  YOB: 1945  MRN: [id number]    Chief Complaint  Right ankle fracture  Subjective  HPI:   Ms. Lidia Krishna is a 78-year-old female with PMHx: T2DM, HTN, cirrhosis with Blake, esophageal varices, A-fib on Eliquis, ESRD, osteomyelitis of the back, anemia of chronic disease, thrombocytopenia.  Presented to the ED today after falling at home states she was trying to get into bed and she was too weak and fell on the ground tried to correct herself and her legs hit the chair denies any head injury or loss of consciousness.  Patient just completed antibiotic therapy for osteomyelitis of her back on her last dialysis day.  Patient states since she was diagnosed with the osteomyelitis she has been feeling weak and having difficulty ambulating and getting around. While in the ED her hemoglobin was found to be 7.9 she does have a history of chronic anemia.  Her platelets were 81 she also has a history of thrombocytopenia.  Mainly her x-rays were negative but she was found to have a right trimalleolar fracture Ortho saw her down in the emergency room took her to surgery the next morning.  She is on dialysis T-TH-SA.  Hospital course uneventful and was discharged to SNF on 10/20  -----10/23   Patient is lying in bed she reports her legs hurt.  She points to her lateral thighs. Appetite fair to good.   -----10/30  Patient sitting wheelchair, is upset, she wants to return to bed, she does not like the way her dressing is wrapped on her ankle, she is having pain in her foot, she does not like the food.  She is overall irritable.  Appetite variable, mainly % meals consumed  Denies HA, dizziness, SOB, CP, N&V or constipation  ----11/2  Dialysis today, she feels fatigued.  She denies pain or discomfort. Right ankle cast intact. Appetite fair.  Denies HA, dizziness, SOB, CP, N&V or constipation.     Review of Systems:  Reviewed chart looking at current medications, treatment,  labs and x-rays and note pertinent positives or negatives, otherwise 10 point system review negative except for what is noted in HPI.    Objective  VS: 117/59, 97.3, 70, 18, 95%, 174#    Physical exam:   Physical Exam  Vitals and nursing note reviewed.   Constitutional:       General: She is awake.      Appearance: Normal appearance.   HENT:      Head: Normocephalic and atraumatic.      Nose: Nose normal.      Mouth/Throat:      Mouth: Mucous membranes are moist.      Pharynx: Oropharynx is clear.   Cardiovascular:      Rate and Rhythm: Normal rate and regular rhythm.      Pulses: Normal pulses.      Heart sounds: Normal heart sounds.   Pulmonary:      Effort: Pulmonary effort is normal.      Breath sounds: Normal breath sounds.   Abdominal:      General: Abdomen is flat. Bowel sounds are normal. There is no distension.      Palpations: Abdomen is soft.      Tenderness: There is no abdominal tenderness.   Musculoskeletal:      Comments: Minimal edema RLE.  Right LE cast intact   Neurological:      Mental Status: She is alert.   Psychiatric:         Behavior: Behavior is cooperative.        Assessment/Plan   78-year-old female with PMHx: T2DM, HTN, cirrhosis with Blake, esophageal varices, A-fib on Eliquis, ESRD, osteomyelitis of the back, anemia of chronic disease, thrombocytopenia.  Presented to the ED today after falling at home states she was trying to get into bed and she was too weak and fell on the ground tried to correct herself and her legs hit the chair.     Trimalleolar fracture of ankle, closed, right, initial encounter  Patient with cast and ace wrap in place. Monitor toes for any changes. F/u with surgeon 3 weeks after discharge from hospital.   Schedule with Dr. Contreras  [phone] to schedule appt.  Will need to see ortho next week.  Will let  know and order for nursing   -Patient to be NWB RLE until first follow up appointment.   -Patient can be continued on normal anticoagulation regimen  from an orthopedics perspective, recommend at least 4 weeks     Physical deconditioning   For PT/OT for mobility, gait training, endurance, safety awareness, ADLs, and assessment of post-rehab needs.     Type 2 diabetes mellitus with nephropathy (CMS/[facility])  C/w NovoLOG Mix 70/30-- 10u/qam INSULIN ASPART PROT-ASP 70-30 8u/q afternoon.  BS stable low 100s     Neuropathy  C/w Gabapentin     ESRD (end stage renal disease) on dialysis (CMS/[facility])  -Dialysis at [facility] in [facility] Tuesday/Thursday/Saturday  -Continue [place]-Isidro  -Continue home midodrine 5 mg on dialysis days     Anemia of chronic disease/Pancytopenia  Pancytopenic-  WBC- 3.58, RBC- 2.26, HGB- 7.6, HCT- 23.9 PLTS- 104- today, 10/30. 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

## 2023-11-06 ENCOUNTER — NURSING HOME VISIT (OUTPATIENT)
Dept: POST ACUTE CARE | Facility: EXTERNAL LOCATION | Age: 78
End: 2023-11-06
Payer: MEDICARE

## 2023-11-06 DIAGNOSIS — D61.818 PANCYTOPENIA (MULTI): ICD-10-CM

## 2023-11-06 DIAGNOSIS — N18.6 ESRD (END STAGE RENAL DISEASE) ON DIALYSIS (MULTI): ICD-10-CM

## 2023-11-06 DIAGNOSIS — Z99.2 ESRD (END STAGE RENAL DISEASE) ON DIALYSIS (MULTI): ICD-10-CM

## 2023-11-06 DIAGNOSIS — M46.20 OSTEOMYELITIS OF SPINE (MULTI): ICD-10-CM

## 2023-11-06 DIAGNOSIS — I10 ESSENTIAL HYPERTENSION: ICD-10-CM

## 2023-11-06 DIAGNOSIS — D63.8 ANEMIA OF CHRONIC DISEASE: ICD-10-CM

## 2023-11-06 DIAGNOSIS — R53.81 PHYSICAL DECONDITIONING: ICD-10-CM

## 2023-11-06 DIAGNOSIS — G62.9 NEUROPATHY: ICD-10-CM

## 2023-11-06 DIAGNOSIS — K74.60 LIVER CIRRHOSIS SECONDARY TO NASH (MULTI): ICD-10-CM

## 2023-11-06 DIAGNOSIS — S82.851A TRIMALLEOLAR FRACTURE OF ANKLE, CLOSED, RIGHT, INITIAL ENCOUNTER: Primary | ICD-10-CM

## 2023-11-06 DIAGNOSIS — K75.81 LIVER CIRRHOSIS SECONDARY TO NASH (MULTI): ICD-10-CM

## 2023-11-06 DIAGNOSIS — E11.21 TYPE 2 DIABETES MELLITUS WITH NEPHROPATHY (MULTI): ICD-10-CM

## 2023-11-06 PROCEDURE — 99308 SBSQ NF CARE LOW MDM 20: CPT | Performed by: FAMILY MEDICINE

## 2023-11-06 NOTE — LETTER
Patient: Jamee Coronel  : 1945    Encounter Date: 2023    Nursing Home Visit  Name: Jamee Coronel  YOB: 1945  MRN: [id number]    Chief Complaint  Right ankle fracture  Subjective  HPI:   Ms. Coronel is a 78-year-old female with PMHx: T2DM, HTN, cirrhosis with Blake, esophageal varices, A-fib on Eliquis, ESRD, osteomyelitis of the back, anemia of chronic disease, thrombocytopenia.  Presented to the ED today after falling at home states she was trying to get into bed and she was too weak and fell on the ground tried to correct herself and her legs hit the chair denies any head injury or loss of consciousness.  Patient just completed antibiotic therapy for osteomyelitis of her back on her last dialysis day.  Patient states since she was diagnosed with the osteomyelitis she has been feeling weak and having difficulty ambulating and getting around. While in the ED her hemoglobin was found to be 7.9 she does have a history of chronic anemia.  Her platelets were 81 she also has a history of thrombocytopenia.  Mainly her x-rays were negative but she was found to have a right trimalleolar fracture Ortho saw her down in the emergency room took her to surgery the next morning.  She is on dialysis .  Hospital course uneventful and was discharged to SNF on 10/20  -----10/23   Patient is lying in bed she reports her legs hurt.  She points to her lateral thighs. Appetite fair to good.   -----10/30  Patient sitting wheelchair, is upset, she wants to return to bed, she does not like the way her dressing is wrapped on her ankle, she is having pain in her foot, she does not like the food.  She is overall irritable.  Appetite variable, mainly % meals consumed  Denies HA, dizziness, SOB, CP, N&V or constipation  ----  Dialysis today, she feels fatigued.  She denies pain or discomfort. Right ankle cast intact. Appetite fair.  Denies HA, dizziness, SOB, CP, N&V or constipation.    ----11/6  Patient feels pretty good today. She states pain is controlled with tylenol.  Was seen by would care and sacral wound is resolved.  Appetite fair, family brings food in for her.  Denies HA, dizziness, SOB, CP, N&V or constipation    Review of Systems:  Reviewed chart looking at current medications, treatment, labs and x-rays and note pertinent positives or negatives, otherwise 10 point system review negative except for what is noted in HPI.    Objective  VS: 142/67, 97.1, 78, 18, 97%, 174#    Physical exam:   Physical Exam  Vitals and nursing note reviewed.   Constitutional:       General: She is awake.      Appearance: Normal appearance.   HENT:      Head: Normocephalic and atraumatic.      Nose: Nose normal.      Mouth/Throat:      Mouth: Mucous membranes are moist.      Pharynx: Oropharynx is clear.   Cardiovascular:      Rate and Rhythm: Normal rate and regular rhythm.      Pulses: Normal pulses.      Heart sounds: Normal heart sounds.   Pulmonary:      Effort: Pulmonary effort is normal.      Breath sounds: Normal breath sounds.   Abdominal:      General: Abdomen is flat. Bowel sounds are normal. There is no distension.      Palpations: Abdomen is soft.      Tenderness: There is no abdominal tenderness.   Musculoskeletal:      Comments: Minimal edema RLE.  Right LE cast intact, toes warm without edema, able to move them w/o issue   Neurological:      Mental Status: She is alert.   Psychiatric:         Behavior: Behavior is cooperative.        Assessment/Plan   78-year-old female with PMHx: T2DM, HTN, cirrhosis with Blake, esophageal varices, A-fib on Eliquis, ESRD, osteomyelitis of the back, anemia of chronic disease, thrombocytopenia.  Presented to the ED today after falling at home states she was trying to get into bed and she was too weak and fell on the ground tried to correct herself and her legs hit the chair.     Trimalleolar fracture of ankle, closed, right, initial encounter  Patient with  cast and ace wrap in place. Monitor toes for any changes. F/u with surgeon 3 weeks after discharge from hospital.   Schedule with Dr. Contreras  [phone] to schedule appt.  Will need to see ortho next week.  Will let  know and order for nursing   -Patient to be NWB RLE until first follow up appointment.   -Patient can be continued on normal anticoagulation regimen from an orthopedics perspective, recommend at least 4 weeks     Physical deconditioning   For PT/OT for mobility, gait training, endurance, safety awareness, ADLs, and assessment of post-rehab needs.     Type 2 diabetes mellitus with nephropathy (CMS/HCC)  C/w NovoLOG Mix 70/30-- 10u/qam INSULIN ASPART PROT-ASP 70-30 8u/q afternoon.  BS stable low 100s     Neuropathy  C/w Gabapentin     ESRD (end stage renal disease) on dialysis (CMS/HCC)  -Dialysis at [facility] in [facility] Tuesday/Thursday/Saturday  -Continue Robert-Isidro  -Continue home midodrine 5 mg on dialysis days     Anemia of chronic disease/Pancytopenia  Pancytopenic-  WBC- 2.58, RBC- 2.15, HGB- 7.2, HCT- 22.7 PLTS- 114- today, 11/6. Will order labs for next week. 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  Will let  know for transportation    Electronically Signed By: KIN Albrecht-CNP   11/7/23  3:05 PM

## 2023-11-07 NOTE — PROGRESS NOTES
Nursing Home Visit  Name: Jamee Coronel  YOB: 1945  MRN: [id number]    Chief Complaint  Right ankle fracture  Subjective  HPI:   Ms. Coronel is a 78-year-old female with PMHx: T2DM, HTN, cirrhosis with Blake, esophageal varices, A-fib on Eliquis, ESRD, osteomyelitis of the back, anemia of chronic disease, thrombocytopenia.  Presented to the ED today after falling at home states she was trying to get into bed and she was too weak and fell on the ground tried to correct herself and her legs hit the chair denies any head injury or loss of consciousness.  Patient just completed antibiotic therapy for osteomyelitis of her back on her last dialysis day.  Patient states since she was diagnosed with the osteomyelitis she has been feeling weak and having difficulty ambulating and getting around. While in the ED her hemoglobin was found to be 7.9 she does have a history of chronic anemia.  Her platelets were 81 she also has a history of thrombocytopenia.  Mainly her x-rays were negative but she was found to have a right trimalleolar fracture Ortho saw her down in the emergency room took her to surgery the next morning.  She is on dialysis T-TH-SA.  Hospital course uneventful and was discharged to SNF on 10/20  -----10/23   Patient is lying in bed she reports her legs hurt.  She points to her lateral thighs. Appetite fair to good.   -----10/30  Patient sitting wheelchair, is upset, she wants to return to bed, she does not like the way her dressing is wrapped on her ankle, she is having pain in her foot, she does not like the food.  She is overall irritable.  Appetite variable, mainly % meals consumed  Denies HA, dizziness, SOB, CP, N&V or constipation  ----11/2  Dialysis today, she feels fatigued.  She denies pain or discomfort. Right ankle cast intact. Appetite fair.  Denies HA, dizziness, SOB, CP, N&V or constipation.   ----11/6  Patient feels pretty good today. She states pain is controlled with  tylenol.  Was seen by would care and sacral wound is resolved.  Appetite fair, family brings food in for her.  Denies HA, dizziness, SOB, CP, N&V or constipation    Review of Systems:  Reviewed chart looking at current medications, treatment, labs and x-rays and note pertinent positives or negatives, otherwise 10 point system review negative except for what is noted in HPI.    Objective  VS: 142/67, 97.1, 78, 18, 97%, 174#    Physical exam:   Physical Exam  Vitals and nursing note reviewed.   Constitutional:       General: She is awake.      Appearance: Normal appearance.   HENT:      Head: Normocephalic and atraumatic.      Nose: Nose normal.      Mouth/Throat:      Mouth: Mucous membranes are moist.      Pharynx: Oropharynx is clear.   Cardiovascular:      Rate and Rhythm: Normal rate and regular rhythm.      Pulses: Normal pulses.      Heart sounds: Normal heart sounds.   Pulmonary:      Effort: Pulmonary effort is normal.      Breath sounds: Normal breath sounds.   Abdominal:      General: Abdomen is flat. Bowel sounds are normal. There is no distension.      Palpations: Abdomen is soft.      Tenderness: There is no abdominal tenderness.   Musculoskeletal:      Comments: Minimal edema RLE.  Right LE cast intact, toes warm without edema, able to move them w/o issue   Neurological:      Mental Status: She is alert.   Psychiatric:         Behavior: Behavior is cooperative.        Assessment/Plan   78-year-old female with PMHx: T2DM, HTN, cirrhosis with Blake, esophageal varices, A-fib on Eliquis, ESRD, osteomyelitis of the back, anemia of chronic disease, thrombocytopenia.  Presented to the ED today after falling at home states she was trying to get into bed and she was too weak and fell on the ground tried to correct herself and her legs hit the chair.     Trimalleolar fracture of ankle, closed, right, initial encounter  Patient with cast and ace wrap in place. Monitor toes for any changes. F/u with surgeon 3 weeks  after discharge from hospital.   Schedule with Dr. Contreras  [phone] to schedule appt.  Will need to see ortho next week.  Will let  know and order for nursing   -Patient to be NWB RLE until first follow up appointment.   -Patient can be continued on normal anticoagulation regimen from an orthopedics perspective, recommend at least 4 weeks     Physical deconditioning   For PT/OT for mobility, gait training, endurance, safety awareness, ADLs, and assessment of post-rehab needs.     Type 2 diabetes mellitus with nephropathy (CMS/[facility])  C/w NovoLOG Mix 70/30-- 10u/qam INSULIN ASPART PROT-ASP 70-30 8u/q afternoon.  BS stable low 100s     Neuropathy  C/w Gabapentin     ESRD (end stage renal disease) on dialysis (CMS/[facility])  -Dialysis at [facility] in [facility] Tuesday/Thursday/Saturday  -Continue Sundown-Isidro  -Continue home midodrine 5 mg on dialysis days     Anemia of chronic disease/Pancytopenia  Pancytopenic-  WBC- 2.58, RBC- 2.15, HGB- 7.2, HCT- 22.7 PLTS- 114- today, 11/6. Will order labs for next week. 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  Will let  know for transportation

## 2023-11-13 ENCOUNTER — NURSING HOME VISIT (OUTPATIENT)
Dept: POST ACUTE CARE | Facility: EXTERNAL LOCATION | Age: 78
End: 2023-11-13
Payer: MEDICARE

## 2023-11-13 DIAGNOSIS — E11.21 TYPE 2 DIABETES MELLITUS WITH NEPHROPATHY (MULTI): ICD-10-CM

## 2023-11-13 DIAGNOSIS — G62.9 NEUROPATHY: ICD-10-CM

## 2023-11-13 DIAGNOSIS — M46.20 OSTEOMYELITIS OF SPINE (MULTI): ICD-10-CM

## 2023-11-13 DIAGNOSIS — D61.818 PANCYTOPENIA (MULTI): ICD-10-CM

## 2023-11-13 DIAGNOSIS — N18.6 ESRD (END STAGE RENAL DISEASE) ON DIALYSIS (MULTI): ICD-10-CM

## 2023-11-13 DIAGNOSIS — D63.8 ANEMIA OF CHRONIC DISEASE: ICD-10-CM

## 2023-11-13 DIAGNOSIS — I10 ESSENTIAL HYPERTENSION: ICD-10-CM

## 2023-11-13 DIAGNOSIS — K74.60 LIVER CIRRHOSIS SECONDARY TO NASH (MULTI): ICD-10-CM

## 2023-11-13 DIAGNOSIS — K75.81 LIVER CIRRHOSIS SECONDARY TO NASH (MULTI): ICD-10-CM

## 2023-11-13 DIAGNOSIS — R53.81 PHYSICAL DECONDITIONING: ICD-10-CM

## 2023-11-13 DIAGNOSIS — Z99.2 ESRD (END STAGE RENAL DISEASE) ON DIALYSIS (MULTI): ICD-10-CM

## 2023-11-13 DIAGNOSIS — S82.851A TRIMALLEOLAR FRACTURE OF ANKLE, CLOSED, RIGHT, INITIAL ENCOUNTER: Primary | ICD-10-CM

## 2023-11-13 PROCEDURE — 99309 SBSQ NF CARE MODERATE MDM 30: CPT | Performed by: FAMILY MEDICINE

## 2023-11-13 NOTE — LETTER
Patient: Jamee Coronel  : 1945    Encounter Date: 2023    Nursing Home Visit  Name: Jamee Coronel  YOB: 1945  MRN: [id number]    Chief Complaint  Right ankle fracture  Subjective  HPI:   Ms. Coronel is a 78-year-old female with PMHx: T2DM, HTN, cirrhosis with Blake, esophageal varices, A-fib on Eliquis, ESRD, osteomyelitis of the back, anemia of chronic disease, thrombocytopenia.  Presented to the ED today after falling at home states she was trying to get into bed and she was too weak and fell on the ground tried to correct herself and her legs hit the chair denies any head injury or loss of consciousness.  Patient just completed antibiotic therapy for osteomyelitis of her back on her last dialysis day.  Patient states since she was diagnosed with the osteomyelitis she has been feeling weak and having difficulty ambulating and getting around. While in the ED her hemoglobin was found to be 7.9 she does have a history of chronic anemia.  Her platelets were 81 she also has a history of thrombocytopenia.  Mainly her x-rays were negative but she was found to have a right trimalleolar fracture Ortho saw her down in the emergency room took her to surgery the next morning.  She is on dialysis .  Hospital course uneventful and was discharged to SNF on 10/20  -----10/23   Patient is lying in bed she reports her legs hurt.  She points to her lateral thighs. Appetite fair to good.   -----10/30  Patient sitting wheelchair, is upset, she wants to return to bed, she does not like the way her dressing is wrapped on her ankle, she is having pain in her foot, she does not like the food.  She is overall irritable.  Appetite variable, mainly % meals consumed  Denies HA, dizziness, SOB, CP, N&V or constipation  ----  Dialysis today, she feels fatigued.  She denies pain or discomfort. Right ankle cast intact. Appetite fair.  Denies HA, dizziness, SOB, CP, N&V or constipation.    ----11/6  Patient feels pretty good today. She states pain is controlled with tylenol.  Was seen by would care and sacral wound is resolved.  Appetite fair, family brings food in for her.  Denies HA, dizziness, SOB, CP, N&V or constipation  -----11/13  Patient in bed is comfortable.  She denies pain or discomfort.  Appetite is variable, depending on what is served.  Family brings in food.  Denies HA, dizziness, SOB, CP, N&V or constipation  Labs drawn today.    Review of Systems:  Reviewed chart looking at current medications, treatment, labs and x-rays and note pertinent positives or negatives, otherwise 10 point system review negative except for what is noted in HPI.    Objective  VS: 134/78, 98.1, 80, 20, 95%, 174#    Physical exam:   Physical Exam  Vitals and nursing note reviewed.   Constitutional:       General: She is awake.      Appearance: Normal appearance.   HENT:      Head: Normocephalic and atraumatic.      Nose: Nose normal.      Mouth/Throat:      Mouth: Mucous membranes are moist.      Pharynx: Oropharynx is clear.   Cardiovascular:      Rate and Rhythm: Normal rate and regular rhythm.      Pulses: Normal pulses.      Heart sounds: Normal heart sounds.   Pulmonary:      Effort: Pulmonary effort is normal.      Breath sounds: Normal breath sounds.   Abdominal:      General: Abdomen is flat. Bowel sounds are normal. There is no distension.      Palpations: Abdomen is soft.      Tenderness: There is no abdominal tenderness.   Musculoskeletal:      Comments: Mild edema LLE.  Minimal edema RLE.  Right LE cast intact, toes warm without edema, able to move them w/o issue   Neurological:      Mental Status: She is alert.   Psychiatric:         Behavior: Behavior is cooperative.     Assessment/Plan   78-year-old female with PMHx: T2DM, HTN, cirrhosis with Blake, esophageal varices, A-fib on Eliquis, ESRD, osteomyelitis of the back, anemia of chronic disease, thrombocytopenia.  Presented to the ED today after  falling at home states she was trying to get into bed and she was too weak and fell on the ground tried to correct herself and her legs hit the chair.     Trimalleolar fracture of ankle, closed, right, initial encounter  Patient with cast and ace wrap in place. Monitor toes for any changes. F/u with surgeon 3 weeks after discharge from hospital.   Schedule with Dr. Contreras  [phone] to schedule appt.  Will need to see ortho next week.  Will let  know and order for nursing   -Patient to be NWB RLE until first follow up appointment.   -Patient can be continued on normal anticoagulation regimen from an orthopedics perspective, recommend at least 4 weeks    Anemia of chronic disease/Pancytopenia  Pancytopenic-  WBC- 3.23, RBC- 2.16, HGB- 7.0, HCT- 21.8 PLTS- 68- today, 11/13.  Pancytopenia- repeat on 11/15.  If HGB drops any lower may have to send for transfusion.  Is not currently taking iron.  Will follow up as to why.     Physical deconditioning   For PT/OT for mobility, gait training, endurance, safety awareness, ADLs, and assessment of post-rehab needs.     Type 2 diabetes mellitus with nephropathy (CMS/HCC)  C/w NovoLOG Mix 70/30-- 10u/qam INSULIN ASPART PROT-ASP 70-30 8u/q afternoon.  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  Transportation scheduled.  Discussed labs with her.      Electronically Signed By: KIN Albrecht-CNP   11/14/23  2:48 PM

## 2023-11-14 NOTE — PROGRESS NOTES
Nursing Home Visit  Name: Jamee Coronel  YOB: 1945  MRN: [id number]    Chief Complaint  Right ankle fracture  Subjective  HPI:   Ms. Coronel is a 78-year-old female with PMHx: T2DM, HTN, cirrhosis with Blake, esophageal varices, A-fib on Eliquis, ESRD, osteomyelitis of the back, anemia of chronic disease, thrombocytopenia.  Presented to the ED today after falling at home states she was trying to get into bed and she was too weak and fell on the ground tried to correct herself and her legs hit the chair denies any head injury or loss of consciousness.  Patient just completed antibiotic therapy for osteomyelitis of her back on her last dialysis day.  Patient states since she was diagnosed with the osteomyelitis she has been feeling weak and having difficulty ambulating and getting around. While in the ED her hemoglobin was found to be 7.9 she does have a history of chronic anemia.  Her platelets were 81 she also has a history of thrombocytopenia.  Mainly her x-rays were negative but she was found to have a right trimalleolar fracture Ortho saw her down in the emergency room took her to surgery the next morning.  She is on dialysis T-TH-SA.  Hospital course uneventful and was discharged to SNF on 10/20  -----10/23   Patient is lying in bed she reports her legs hurt.  She points to her lateral thighs. Appetite fair to good.   -----10/30  Patient sitting wheelchair, is upset, she wants to return to bed, she does not like the way her dressing is wrapped on her ankle, she is having pain in her foot, she does not like the food.  She is overall irritable.  Appetite variable, mainly % meals consumed  Denies HA, dizziness, SOB, CP, N&V or constipation  ----11/2  Dialysis today, she feels fatigued.  She denies pain or discomfort. Right ankle cast intact. Appetite fair.  Denies HA, dizziness, SOB, CP, N&V or constipation.   ----11/6  Patient feels pretty good today. She states pain is controlled with  tylenol.  Was seen by would care and sacral wound is resolved.  Appetite fair, family brings food in for her.  Denies HA, dizziness, SOB, CP, N&V or constipation  -----11/13  Patient in bed is comfortable.  She denies pain or discomfort.  Appetite is variable, depending on what is served.  Family brings in food.  Denies HA, dizziness, SOB, CP, N&V or constipation  Labs drawn today.    Review of Systems:  Reviewed chart looking at current medications, treatment, labs and x-rays and note pertinent positives or negatives, otherwise 10 point system review negative except for what is noted in HPI.    Objective  VS: 134/78, 98.1, 80, 20, 95%, 174#    Physical exam:   Physical Exam  Vitals and nursing note reviewed.   Constitutional:       General: She is awake.      Appearance: Normal appearance.   HENT:      Head: Normocephalic and atraumatic.      Nose: Nose normal.      Mouth/Throat:      Mouth: Mucous membranes are moist.      Pharynx: Oropharynx is clear.   Cardiovascular:      Rate and Rhythm: Normal rate and regular rhythm.      Pulses: Normal pulses.      Heart sounds: Normal heart sounds.   Pulmonary:      Effort: Pulmonary effort is normal.      Breath sounds: Normal breath sounds.   Abdominal:      General: Abdomen is flat. Bowel sounds are normal. There is no distension.      Palpations: Abdomen is soft.      Tenderness: There is no abdominal tenderness.   Musculoskeletal:      Comments: Mild edema LLE.  Minimal edema RLE.  Right LE cast intact, toes warm without edema, able to move them w/o issue   Neurological:      Mental Status: She is alert.   Psychiatric:         Behavior: Behavior is cooperative.     Assessment/Plan   78-year-old female with PMHx: T2DM, HTN, cirrhosis with Blake, esophageal varices, A-fib on Eliquis, ESRD, osteomyelitis of the back, anemia of chronic disease, thrombocytopenia.  Presented to the ED today after falling at home states she was trying to get into bed and she was too weak and  fell on the ground tried to correct herself and her legs hit the chair.     Trimalleolar fracture of ankle, closed, right, initial encounter  Patient with cast and ace wrap in place. Monitor toes for any changes. F/u with surgeon 3 weeks after discharge from hospital.   Schedule with Dr. Contreras  [phone] to schedule appt.  Will need to see ortho next week.  Will let  know and order for nursing   -Patient to be NWB RLE until first follow up appointment.   -Patient can be continued on normal anticoagulation regimen from an orthopedics perspective, recommend at least 4 weeks    Anemia of chronic disease/Pancytopenia  Pancytopenic-  WBC- 3.23, RBC- 2.16, HGB- 7.0, HCT- 21.8 PLTS- 68- today, 11/13.  Pancytopenia- repeat on 11/15.  If HGB drops any lower may have to send for transfusion.  Is not currently taking iron.  Will follow up as to why.     Physical deconditioning   For PT/OT for mobility, gait training, endurance, safety awareness, ADLs, and assessment of post-rehab needs.     Type 2 diabetes mellitus with nephropathy (CMS/HCC)  C/w NovoLOG Mix 70/30-- 10u/qam INSULIN ASPART PROT-ASP 70-30 8u/q afternoon.  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  Transportation scheduled.  Discussed labs with her.

## 2023-11-15 ENCOUNTER — APPOINTMENT (OUTPATIENT)
Dept: ORTHOPEDIC SURGERY | Facility: HOSPITAL | Age: 78
End: 2023-11-15
Payer: MEDICARE

## 2023-11-16 ENCOUNTER — NURSING HOME VISIT (OUTPATIENT)
Dept: POST ACUTE CARE | Facility: EXTERNAL LOCATION | Age: 78
End: 2023-11-16
Payer: MEDICARE

## 2023-11-16 DIAGNOSIS — Z99.2 ESRD (END STAGE RENAL DISEASE) ON DIALYSIS (MULTI): ICD-10-CM

## 2023-11-16 DIAGNOSIS — M46.20 OSTEOMYELITIS OF SPINE (MULTI): ICD-10-CM

## 2023-11-16 DIAGNOSIS — K75.81 LIVER CIRRHOSIS SECONDARY TO NASH (MULTI): ICD-10-CM

## 2023-11-16 DIAGNOSIS — R53.81 PHYSICAL DECONDITIONING: ICD-10-CM

## 2023-11-16 DIAGNOSIS — S82.851A TRIMALLEOLAR FRACTURE OF ANKLE, CLOSED, RIGHT, INITIAL ENCOUNTER: Primary | ICD-10-CM

## 2023-11-16 DIAGNOSIS — K74.60 LIVER CIRRHOSIS SECONDARY TO NASH (MULTI): ICD-10-CM

## 2023-11-16 DIAGNOSIS — D63.8 ANEMIA OF CHRONIC DISEASE: ICD-10-CM

## 2023-11-16 DIAGNOSIS — D61.818 PANCYTOPENIA (MULTI): ICD-10-CM

## 2023-11-16 DIAGNOSIS — N18.6 ESRD (END STAGE RENAL DISEASE) ON DIALYSIS (MULTI): ICD-10-CM

## 2023-11-16 DIAGNOSIS — G62.9 NEUROPATHY: ICD-10-CM

## 2023-11-16 DIAGNOSIS — E11.21 TYPE 2 DIABETES MELLITUS WITH NEPHROPATHY (MULTI): ICD-10-CM

## 2023-11-16 DIAGNOSIS — I10 ESSENTIAL HYPERTENSION: ICD-10-CM

## 2023-11-16 PROCEDURE — 99309 SBSQ NF CARE MODERATE MDM 30: CPT | Performed by: FAMILY MEDICINE

## 2023-11-16 NOTE — LETTER
Patient: Jamee Coronel  : 1945    Encounter Date: 2023    Nursing Home Visit  Name: Jamee Coronel  YOB: 1945  MRN: [id number]    Chief Complaint  Right ankle fracture/lab review  Subjective  HPI:   Ms. Coronel is a 78-year-old female with PMHx: T2DM, HTN, cirrhosis with Blake, esophageal varices, A-fib on Eliquis, ESRD, osteomyelitis of the back, anemia of chronic disease, thrombocytopenia.  Presented to the ED today after falling at home states she was trying to get into bed and she was too weak and fell on the ground tried to correct herself and her legs hit the chair denies any head injury or loss of consciousness.  Patient just completed antibiotic therapy for osteomyelitis of her back on her last dialysis day.  Patient states since she was diagnosed with the osteomyelitis she has been feeling weak and having difficulty ambulating and getting around. While in the ED her hemoglobin was found to be 7.9 she does have a history of chronic anemia.  Her platelets were 81 she also has a history of thrombocytopenia.  Mainly her x-rays were negative but she was found to have a right trimalleolar fracture Ortho saw her down in the emergency room took her to surgery the next morning.  She is on dialysis .  Hospital course uneventful and was discharged to SNF on 10/20  -----10/23   Patient is lying in bed she reports her legs hurt.  She points to her lateral thighs. Appetite fair to good.   -----10/30  Patient sitting wheelchair, is upset, she wants to return to bed, she does not like the way her dressing is wrapped on her ankle, she is having pain in her foot, she does not like the food.  She is overall irritable.  Appetite variable, mainly % meals consumed  Denies HA, dizziness, SOB, CP, N&V or constipation  ----  Dialysis today, she feels fatigued.  She denies pain or discomfort. Right ankle cast intact. Appetite fair.  Denies HA, dizziness, SOB, CP, N&V or  constipation.   ----11/6  Patient feels pretty good today. She states pain is controlled with tylenol.  Was seen by would care and sacral wound is resolved.  Appetite fair, family brings food in for her.  Denies HA, dizziness, SOB, CP, N&V or constipation  -----11/13  Patient in bed is comfortable.  She denies pain or discomfort.  Appetite is variable, depending on what is served.  Family brings in food.  Denies HA, dizziness, SOB, CP, N&V or constipation  Labs drawn today.  ----11/16  Patient returned from dialysis. She states her ortho appt was pushed back to December.  She reports nobody has looked at her right leg.  She denies pain or discomfort presently.  Appetite fair, family brings food for her most of the time.  Denies HA, dizziness, SOB, CP, N&V or constipation    Review of Systems:  Reviewed chart looking at current medications, treatment, labs and x-rays and note pertinent positives or negatives, otherwise 10 point system review negative except for what is noted in HPI.    Objective  VS: 133/59,  97.1, 62, 19, 96%, 174#    Physical exam:   Physical Exam  Vitals and nursing note reviewed.   Constitutional:       Appearance: Normal appearance.   HENT:      Head: Normocephalic and atraumatic.      Mouth/Throat:      Mouth: Mucous membranes are moist.   Cardiovascular:      Rate and Rhythm: Normal rate and regular rhythm.      Pulses: Normal pulses.   Pulmonary:      Effort: Pulmonary effort is normal.   Abdominal:      General: Abdomen is flat. There is no distension.      Palpations: Abdomen is soft.      Tenderness: There is no abdominal tenderness.   Musculoskeletal:      Comments: Mild edema LLE.  Minimal edema RLE.  Right LE cast intact, toes warm without edema, able to move them w/o issue   Skin:     General: Skin is warm and dry.   Neurological:      General: No focal deficit present.      Mental Status: She is oriented to person, place, and time. Mental status is at baseline.   Psychiatric:          Mood and Affect: Mood normal.         Behavior: Behavior normal. Behavior is cooperative.         Thought Content: Thought content normal.     Assessment/Plan   78-year-old female with PMHx: T2DM, HTN, cirrhosis with Blake, esophageal varices, A-fib on Eliquis, ESRD, osteomyelitis of the back, anemia of chronic disease, thrombocytopenia.  Presented to the ED today after falling at home states she was trying to get into bed and she was too weak and fell on the ground tried to correct herself and her legs hit the chair.     Trimalleolar fracture of ankle, closed, right, initial encounter  Patient with cast stable, rewrapped ace wrapped.   Monitor toes for any changes. F/u with surgeon 3 weeks after discharge from hospital.   Rescheduled with Dr. Contreras  [phone] in December, per patient.  Transportation needs to be scheduled as well. to schedule appt.  Appt rescheduled for December.  Remains non weight bearing.  Patient claims nobody has looked at her right leg since her admission.  I did see it on admission, it was stable. Wound team follows up after.  Today removed ACE wrap.  She complained that the cast is rubbing against her little toe and is causing her pain.  Was able to cut back the cast a bit to free up her toe.  There was an area that was reddened, but no open areas.  Cast is intact, no, strike through, no odor. Rewrapped cast with ace.        Anemia of chronic disease/Pancytopenia  Pancytopenic-  WBC- 2.71, RBC- 2.23, HGB- 7.2, HCT- 23.1, PLTS- 67- today, 11/13.  Pancytopenia- repeat on 11/15.  If HGB drops any lower may have to send for transfusion.  Is not currently taking iron.  Need schedule visit with hematologist???     Physical deconditioning   For PT/OT for mobility, gait training, endurance, safety awareness, ADLs, and assessment of post-rehab needs.     Type 2 diabetes mellitus with nephropathy (CMS/HCC)  C/w NovoLOG Mix 70/30-- 10u/qam INSULIN ASPART PROT-ASP 70-30 8u/q afternoon.  BS stable  low 100s     Neuropathy  C/w Gabapentin     ESRD (end stage renal disease) on dialysis (CMS/HCC)  -Dialysis at [facility] in [facility] Tuesday/Thursday/Saturday  -C/w Parshall-Isidro and midodrine 5 mg on dialysis days      Essential hypertension  Not on current medications at present BP is stable     Liver cirrhosis secondary to GEORGE (CMS/HCC)  Hx of GEORGE will order LFTS for baseline     Osteomyelitis of spine (CMS/HCC)  C/w lidocaine patch for pain and prn oxycodone    Patient states has f/u with ortho in December.  Transportation scheduled.  Discussed labs with her, monitor      Electronically Signed By: KIN Albrecht-CNP   11/17/23  2:59 PM

## 2023-11-17 ENCOUNTER — APPOINTMENT (OUTPATIENT)
Dept: ORTHOPEDIC SURGERY | Facility: HOSPITAL | Age: 78
End: 2023-11-17
Payer: MEDICARE

## 2023-11-17 NOTE — PROGRESS NOTES
Nursing Home Visit  Name: Jamee Coronel  YOB: 1945  MRN: [id number]    Chief Complaint  Right ankle fracture/lab review  Subjective  HPI:   Ms. Coronel is a 78-year-old female with PMHx: T2DM, HTN, cirrhosis with Blake, esophageal varices, A-fib on Eliquis, ESRD, osteomyelitis of the back, anemia of chronic disease, thrombocytopenia.  Presented to the ED today after falling at home states she was trying to get into bed and she was too weak and fell on the ground tried to correct herself and her legs hit the chair denies any head injury or loss of consciousness.  Patient just completed antibiotic therapy for osteomyelitis of her back on her last dialysis day.  Patient states since she was diagnosed with the osteomyelitis she has been feeling weak and having difficulty ambulating and getting around. While in the ED her hemoglobin was found to be 7.9 she does have a history of chronic anemia.  Her platelets were 81 she also has a history of thrombocytopenia.  Mainly her x-rays were negative but she was found to have a right trimalleolar fracture Ortho saw her down in the emergency room took her to surgery the next morning.  She is on dialysis T-TH-SA.  Hospital course uneventful and was discharged to SNF on 10/20  -----10/23   Patient is lying in bed she reports her legs hurt.  She points to her lateral thighs. Appetite fair to good.   -----10/30  Patient sitting wheelchair, is upset, she wants to return to bed, she does not like the way her dressing is wrapped on her ankle, she is having pain in her foot, she does not like the food.  She is overall irritable.  Appetite variable, mainly % meals consumed  Denies HA, dizziness, SOB, CP, N&V or constipation  ----11/2  Dialysis today, she feels fatigued.  She denies pain or discomfort. Right ankle cast intact. Appetite fair.  Denies HA, dizziness, SOB, CP, N&V or constipation.   ----11/6  Patient feels pretty good today. She states pain is  controlled with tylenol.  Was seen by [facility] care and sacral wound is resolved.  Appetite fair, family brings food in for her.  Denies HA, dizziness, SOB, CP, N&V or constipation  -----11/13  Patient in bed is comfortable.  She denies pain or discomfort.  Appetite is variable, depending on what is served.  Family brings in food.  Denies HA, dizziness, SOB, CP, N&V or constipation  Labs drawn today.  ----11/16  Patient returned from dialysis. She states her ortho appt was pushed back to December.  She reports nobody has looked at her right leg.  She denies pain or discomfort presently.  Appetite fair, family brings food for her most of the time.  Denies HA, dizziness, SOB, CP, N&V or constipation    Review of Systems:  Reviewed chart looking at current medications, treatment, labs and x-rays and note pertinent positives or negatives, otherwise 10 point system review negative except for what is noted in HPI.    Objective  VS: 133/59,  97.1, 62, 19, 96%, 174#    Physical exam:   Physical Exam  Vitals and nursing note reviewed.   Constitutional:       Appearance: Normal appearance.   HENT:      Head: Normocephalic and atraumatic.      Mouth/Throat:      Mouth: Mucous membranes are moist.   Cardiovascular:      Rate and Rhythm: Normal rate and regular rhythm.      Pulses: Normal pulses.   Pulmonary:      Effort: Pulmonary effort is normal.   Abdominal:      General: Abdomen is flat. There is no distension.      Palpations: Abdomen is soft.      Tenderness: There is no abdominal tenderness.   Musculoskeletal:      Comments: Mild edema LLE.  Minimal edema RLE.  Right LE cast intact, toes warm without edema, able to move them w/o issue   Skin:     General: Skin is warm and dry.   Neurological:      General: No focal deficit present.      Mental Status: She is oriented to person, place, and time. Mental status is at baseline.   Psychiatric:         Mood and Affect: Mood normal.         Behavior: Behavior normal. Behavior is  cooperative.         Thought Content: Thought content normal.     Assessment/Plan   78-year-old female with PMHx: T2DM, HTN, cirrhosis with Blake, esophageal varices, A-fib on Eliquis, ESRD, osteomyelitis of the back, anemia of chronic disease, thrombocytopenia.  Presented to the ED today after falling at home states she was trying to get into bed and she was too weak and fell on the ground tried to correct herself and her legs hit the chair.     Trimalleolar fracture of ankle, closed, right, initial encounter  Patient with cast stable, rewrapped ace wrapped.   Monitor toes for any changes. F/u with surgeon 3 weeks after discharge from hospital.   Rescheduled with Dr. Contreras  [phone] in December, per patient.  Transportation needs to be scheduled as well. to schedule appt.  Appt rescheduled for December.  Remains non weight bearing.  Patient claims nobody has looked at her right leg since her admission.  I did see it on admission, it was stable. Wound team follows up after.  Today removed ACE wrap.  She complained that the cast is rubbing against her little toe and is causing her pain.  Was able to cut back the cast a bit to free up her toe.  There was an area that was reddened, but no open areas.  Cast is intact, no, strike through, no odor. Rewrapped cast with ace.        Anemia of chronic disease/Pancytopenia  Pancytopenic-  WBC- 2.71, RBC- 2.23, HGB- 7.2, HCT- 23.1, PLTS- 67- today, 11/13.  Pancytopenia- repeat on 11/15.  If HGB drops any lower may have to send for transfusion.  Is not currently taking iron.  Need schedule visit with hematologist???     Physical deconditioning   For PT/OT for mobility, gait training, endurance, safety awareness, ADLs, and assessment of post-rehab needs.     Type 2 diabetes mellitus with nephropathy (CMS/HCC)  C/w NovoLOG Mix 70/30-- 10u/qam INSULIN ASPART PROT-ASP 70-30 8u/q afternoon.  BS stable low 100s     Neuropathy  C/w Gabapentin     ESRD (end stage renal disease) on  dialysis (CMS/HCC)  -Dialysis at [facility] in [facility] Tuesday/Thursday/Saturday  -C/w Robert-Isidro and midodrine 5 mg on dialysis days      Essential hypertension  Not on current medications at present BP is stable     Liver cirrhosis secondary to GEORGE (CMS/HCC)  Hx of GEORGE will order LFTS for baseline     Osteomyelitis of spine (CMS/HCC)  C/w lidocaine patch for pain and prn oxycodone    Patient states has f/u with ortho in December.  Transportation scheduled.  Discussed labs with her, monitor

## 2023-11-20 ENCOUNTER — NURSING HOME VISIT (OUTPATIENT)
Dept: POST ACUTE CARE | Facility: EXTERNAL LOCATION | Age: 78
End: 2023-11-20
Payer: MEDICARE

## 2023-11-20 DIAGNOSIS — N18.6 ESRD (END STAGE RENAL DISEASE) ON DIALYSIS (MULTI): ICD-10-CM

## 2023-11-20 DIAGNOSIS — K75.81 LIVER CIRRHOSIS SECONDARY TO NASH (MULTI): ICD-10-CM

## 2023-11-20 DIAGNOSIS — Z99.2 ESRD (END STAGE RENAL DISEASE) ON DIALYSIS (MULTI): ICD-10-CM

## 2023-11-20 DIAGNOSIS — S82.851A TRIMALLEOLAR FRACTURE OF ANKLE, CLOSED, RIGHT, INITIAL ENCOUNTER: Primary | ICD-10-CM

## 2023-11-20 DIAGNOSIS — D63.8 ANEMIA OF CHRONIC DISEASE: ICD-10-CM

## 2023-11-20 DIAGNOSIS — D61.818 PANCYTOPENIA (MULTI): ICD-10-CM

## 2023-11-20 DIAGNOSIS — I10 ESSENTIAL HYPERTENSION: ICD-10-CM

## 2023-11-20 DIAGNOSIS — E11.21 TYPE 2 DIABETES MELLITUS WITH NEPHROPATHY (MULTI): ICD-10-CM

## 2023-11-20 DIAGNOSIS — R53.81 PHYSICAL DECONDITIONING: ICD-10-CM

## 2023-11-20 DIAGNOSIS — M46.20 OSTEOMYELITIS OF SPINE (MULTI): ICD-10-CM

## 2023-11-20 DIAGNOSIS — K74.60 LIVER CIRRHOSIS SECONDARY TO NASH (MULTI): ICD-10-CM

## 2023-11-20 DIAGNOSIS — G62.9 NEUROPATHY: ICD-10-CM

## 2023-11-20 PROCEDURE — 99309 SBSQ NF CARE MODERATE MDM 30: CPT | Performed by: FAMILY MEDICINE

## 2023-11-20 NOTE — LETTER
Patient: Jamee Coronel  : 1945    Encounter Date: 2023    Nursing Home Visit  Name: Jamee Coronel  YOB: 1945  MRN: [id number]    Chief Complaint  Right ankle fracture/lab review  Subjective  HPI:   Ms. Coronel is a 78-year-old female with PMHx: T2DM, HTN, cirrhosis with Blake, esophageal varices, A-fib on Eliquis, ESRD, osteomyelitis of the back, anemia of chronic disease, thrombocytopenia.  Presented to the ED today after falling at home states she was trying to get into bed and she was too weak and fell on the ground tried to correct herself and her legs hit the chair denies any head injury or loss of consciousness.  Patient just completed antibiotic therapy for osteomyelitis of her back on her last dialysis day.  Patient states since she was diagnosed with the osteomyelitis she has been feeling weak and having difficulty ambulating and getting around. While in the ED her hemoglobin was found to be 7.9 she does have a history of chronic anemia.  Her platelets were 81 she also has a history of thrombocytopenia.  Mainly her x-rays were negative but she was found to have a right trimalleolar fracture Ortho saw her down in the emergency room took her to surgery the next morning.  She is on dialysis .  Hospital course uneventful and was discharged to SNF on 10/20  -----10/23   Patient is lying in bed she reports her legs hurt.  She points to her lateral thighs. Appetite fair to good.   -----10/30  Patient sitting wheelchair, is upset, she wants to return to bed, she does not like the way her dressing is wrapped on her ankle, she is having pain in her foot, she does not like the food.  She is overall irritable.  Appetite variable, mainly % meals consumed  Denies HA, dizziness, SOB, CP, N&V or constipation  ----  Dialysis today, she feels fatigued.  She denies pain or discomfort. Right ankle cast intact. Appetite fair.  Denies HA, dizziness, SOB, CP, N&V or  constipation.   ----11/6  Patient feels pretty good today. She states pain is controlled with tylenol.  Was seen by would care and sacral wound is resolved.  Appetite fair, family brings food in for her.  Denies HA, dizziness, SOB, CP, N&V or constipation  -----11/13  Patient in bed is comfortable.  She denies pain or discomfort.  Appetite is variable, depending on what is served.  Family brings in food.  Denies HA, dizziness, SOB, CP, N&V or constipation  Labs drawn today.  ----11/16  Patient returned from dialysis. She states her ortho appt was pushed back to December.  She reports nobody has looked at her right leg.  She denies pain or discomfort presently.  Appetite fair, family brings food for her most of the time.  Denies HA, dizziness, SOB, CP, N&V or constipation  ---11/20 Patient had dialysis today 2/2 to holiday this week. She feels fatigued and would like to go to bed.  She c/o bilateral knee pain. She states her right little toe feels better after trimming cast material.  Appetite is fair, but family brings food in to her    Review of Systems:  Reviewed chart looking at current medications, treatment, labs and x-rays and note pertinent positives or negatives, otherwise 10 point system review negative except for what is noted in HPI.    Objective  VS: 139/63, 96.7, 67, 19, 97%, 174#    Physical exam:   Physical Exam  Vitals and nursing note reviewed.   Constitutional:       Appearance: Normal appearance.   HENT:      Head: Normocephalic and atraumatic.      Mouth/Throat:      Mouth: Mucous membranes are moist.   Cardiovascular:      Rate and Rhythm: Normal rate and regular rhythm.      Pulses: Normal pulses.   Pulmonary:      Effort: Pulmonary effort is normal.   Abdominal:      General: Abdomen is flat. There is no distension.      Palpations: Abdomen is soft.      Tenderness: There is no abdominal tenderness.   Musculoskeletal:      Comments: Mild edema LLE.  Minimal edema RLE.  Right LE cast intact,  toes warm without edema, able to move them w/o issue   Skin:     General: Skin is warm and dry.   Neurological:      General: No focal deficit present.      Mental Status: She is oriented to person, place, and time. Mental status is at baseline.   Psychiatric:         Mood and Affect: Mood normal.         Behavior: Behavior normal. Behavior is cooperative.         Thought Content: Thought content normal.     Assessment/Plan   78-year-old female with PMHx: T2DM, HTN, cirrhosis with Blake, esophageal varices, A-fib on Eliquis, ESRD, osteomyelitis of the back, anemia of chronic disease, thrombocytopenia.  Presented to the ED today after falling at home states she was trying to get into bed and she was too weak and fell on the ground tried to correct herself and her legs hit the chair.     Trimalleolar fracture of ankle, closed, right, initial encounter  Patient with cast stable, rewrapped ace wrapped.   Monitor toes for any changes. F/u with surgeon 3 weeks after discharge from hospital.   Rescheduled with Dr. Contreras  [phone] in December, per patient.  Transportation needs to be scheduled as well. to schedule appt.  Appt rescheduled for December.  Remains non weight bearing.  Patient claims nobody has looked at her right leg since her admission.  I did see it on admission, it was stable. Wound team follows up after.  Today removed ACE wrap.  She complained that the cast is rubbing against her little toe and is causing her pain.  Was able to cut back the cast a bit to free up her toe.  There was an area that was reddened, but no open areas.  Cast is intact, no, strike through, no odor. Rewrapped cast with ace.        Anemia of chronic disease/Pancytopenia  Pancytopenic-  WBC- 2.96, RBC- 2.33, HGB- 7.5, HCT- 24.1, PLTS- 89- today, 11/20.  Pancytopenia- trending up  very slowly.  Repeat labs on Monday     Physical deconditioning   For PT/OT for mobility, gait training, endurance, safety awareness, ADLs, and assessment  of post-rehab needs.     Type 2 diabetes mellitus with nephropathy (CMS/[facility])  C/w NovoLOG Mix 70/30-- 10u/qam INSULIN ASPART PROT-ASP 70-30 8u/q afternoon.  BS stable low 100s     Neuropathy  C/w Gabapentin     ESRD (end stage renal disease) on dialysis (CMS/[facility])  -Dialysis at [facility] in [facility] Tuesday/Thursday/Saturday  -C/w Irion-Isidro and midodrine 5 mg on dialysis days      Essential hypertension  Not on current medications at present BP is stable     Liver cirrhosis secondary to GEORGE (CMS/[facility])  Hx of GEORGE will order LFTS for baseline     Osteomyelitis of spine (CMS/[facility])  C/w lidocaine patch for pain and prn oxycodone    Patient states has f/u with ortho in December.  Transportation scheduled.  Discussed labs with her, monitor      Electronically Signed By: KIN Albrecht-CNP   11/25/23  4:27 PM

## 2023-11-25 NOTE — PROGRESS NOTES
Nursing Home Visit  Name: Jamee Coronel  YOB: 1945  MRN: [id number]    Chief Complaint  Right ankle fracture/lab review  Subjective  HPI:   Ms. Coronel is a 78-year-old female with PMHx: T2DM, HTN, cirrhosis with Blake, esophageal varices, A-fib on Eliquis, ESRD, osteomyelitis of the back, anemia of chronic disease, thrombocytopenia.  Presented to the ED today after falling at home states she was trying to get into bed and she was too weak and fell on the ground tried to correct herself and her legs hit the chair denies any head injury or loss of consciousness.  Patient just completed antibiotic therapy for osteomyelitis of her back on her last dialysis day.  Patient states since she was diagnosed with the osteomyelitis she has been feeling weak and having difficulty ambulating and getting around. While in the ED her hemoglobin was found to be 7.9 she does have a history of chronic anemia.  Her platelets were 81 she also has a history of thrombocytopenia.  Mainly her x-rays were negative but she was found to have a right trimalleolar fracture Ortho saw her down in the emergency room took her to surgery the next morning.  She is on dialysis T-TH-SA.  Hospital course uneventful and was discharged to SNF on 10/20  -----10/23   Patient is lying in bed she reports her legs hurt.  She points to her lateral thighs. Appetite fair to good.   -----10/30  Patient sitting wheelchair, is upset, she wants to return to bed, she does not like the way her dressing is wrapped on her ankle, she is having pain in her foot, she does not like the food.  She is overall irritable.  Appetite variable, mainly % meals consumed  Denies HA, dizziness, SOB, CP, N&V or constipation  ----11/2  Dialysis today, she feels fatigued.  She denies pain or discomfort. Right ankle cast intact. Appetite fair.  Denies HA, dizziness, SOB, CP, N&V or constipation.   ----11/6  Patient feels pretty good today. She states pain is  controlled with tylenol.  Was seen by [facility] care and sacral wound is resolved.  Appetite fair, family brings food in for her.  Denies HA, dizziness, SOB, CP, N&V or constipation  -----11/13  Patient in bed is comfortable.  She denies pain or discomfort.  Appetite is variable, depending on what is served.  Family brings in food.  Denies HA, dizziness, SOB, CP, N&V or constipation  Labs drawn today.  ----11/16  Patient returned from dialysis. She states her ortho appt was pushed back to December.  She reports nobody has looked at her right leg.  She denies pain or discomfort presently.  Appetite fair, family brings food for her most of the time.  Denies HA, dizziness, SOB, CP, N&V or constipation  ---11/20 Patient had dialysis today 2/2 to holiday this week. She feels fatigued and would like to go to bed.  She c/o bilateral knee pain. She states her right little toe feels better after trimming cast material.  Appetite is fair, but family brings food in to her    Review of Systems:  Reviewed chart looking at current medications, treatment, labs and x-rays and note pertinent positives or negatives, otherwise 10 point system review negative except for what is noted in HPI.    Objective  VS: 139/63, 96.7, 67, 19, 97%, 174#    Physical exam:   Physical Exam  Vitals and nursing note reviewed.   Constitutional:       Appearance: Normal appearance.   HENT:      Head: Normocephalic and atraumatic.      Mouth/Throat:      Mouth: Mucous membranes are moist.   Cardiovascular:      Rate and Rhythm: Normal rate and regular rhythm.      Pulses: Normal pulses.   Pulmonary:      Effort: Pulmonary effort is normal.   Abdominal:      General: Abdomen is flat. There is no distension.      Palpations: Abdomen is soft.      Tenderness: There is no abdominal tenderness.   Musculoskeletal:      Comments: Mild edema LLE.  Minimal edema RLE.  Right LE cast intact, toes warm without edema, able to move them w/o issue   Skin:     General: Skin  is warm and dry.   Neurological:      General: No focal deficit present.      Mental Status: She is oriented to person, place, and time. Mental status is at baseline.   Psychiatric:         Mood and Affect: Mood normal.         Behavior: Behavior normal. Behavior is cooperative.         Thought Content: Thought content normal.     Assessment/Plan   78-year-old female with PMHx: T2DM, HTN, cirrhosis with Blake, esophageal varices, A-fib on Eliquis, ESRD, osteomyelitis of the back, anemia of chronic disease, thrombocytopenia.  Presented to the ED today after falling at home states she was trying to get into bed and she was too weak and fell on the ground tried to correct herself and her legs hit the chair.     Trimalleolar fracture of ankle, closed, right, initial encounter  Patient with cast stable, rewrapped ace wrapped.   Monitor toes for any changes. F/u with surgeon 3 weeks after discharge from hospital.   Rescheduled with Dr. Contreras  [phone] in December, per patient.  Transportation needs to be scheduled as well. to schedule appt.  Appt rescheduled for December.  Remains non weight bearing.  Patient claims nobody has looked at her right leg since her admission.  I did see it on admission, it was stable. Wound team follows up after.  Today removed ACE wrap.  She complained that the cast is rubbing against her little toe and is causing her pain.  Was able to cut back the cast a bit to free up her toe.  There was an area that was reddened, but no open areas.  Cast is intact, no, strike through, no odor. Rewrapped cast with ace.        Anemia of chronic disease/Pancytopenia  Pancytopenic-  WBC- 2.96, RBC- 2.33, HGB- 7.5, HCT- 24.1, PLTS- 89- today, 11/20.  Pancytopenia- trending up  very slowly.  Repeat labs on Monday     Physical deconditioning   For PT/OT for mobility, gait training, endurance, safety awareness, ADLs, and assessment of post-rehab needs.     Type 2 diabetes mellitus with nephropathy  (CMS/[facility])  C/w NovoLOG Mix 70/30-- 10u/qam INSULIN ASPART PROT-ASP 70-30 8u/q afternoon.  BS stable low 100s     Neuropathy  C/w Gabapentin     ESRD (end stage renal disease) on dialysis (CMS/[facility])  -Dialysis at [facility] in [place] [place] Tuesday/Thursday/Saturday  -C/w Robert-Isidro and midodrine 5 mg on dialysis days      Essential hypertension  Not on current medications at present BP is stable     Liver cirrhosis secondary to GEORGE (CMS/[facility])  Hx of GEORGE will order LFTS for baseline     Osteomyelitis of spine (CMS/[facility])  C/w lidocaine patch for pain and prn oxycodone    Patient states has f/u with ortho in December.  Transportation scheduled.  Discussed labs with her, monitor

## 2023-11-27 ENCOUNTER — NURSING HOME VISIT (OUTPATIENT)
Dept: POST ACUTE CARE | Facility: EXTERNAL LOCATION | Age: 78
End: 2023-11-27
Payer: MEDICARE

## 2023-11-27 DIAGNOSIS — S82.851A TRIMALLEOLAR FRACTURE OF ANKLE, CLOSED, RIGHT, INITIAL ENCOUNTER: Primary | ICD-10-CM

## 2023-11-27 DIAGNOSIS — D63.8 ANEMIA OF CHRONIC DISEASE: ICD-10-CM

## 2023-11-27 DIAGNOSIS — Z99.2 ESRD (END STAGE RENAL DISEASE) ON DIALYSIS (MULTI): ICD-10-CM

## 2023-11-27 DIAGNOSIS — M46.20 OSTEOMYELITIS OF SPINE (MULTI): ICD-10-CM

## 2023-11-27 DIAGNOSIS — I10 ESSENTIAL HYPERTENSION: ICD-10-CM

## 2023-11-27 DIAGNOSIS — K74.60 LIVER CIRRHOSIS SECONDARY TO NASH (MULTI): ICD-10-CM

## 2023-11-27 DIAGNOSIS — E11.21 TYPE 2 DIABETES MELLITUS WITH NEPHROPATHY (MULTI): ICD-10-CM

## 2023-11-27 DIAGNOSIS — K75.81 LIVER CIRRHOSIS SECONDARY TO NASH (MULTI): ICD-10-CM

## 2023-11-27 DIAGNOSIS — R53.81 PHYSICAL DECONDITIONING: ICD-10-CM

## 2023-11-27 DIAGNOSIS — D61.818 PANCYTOPENIA (MULTI): ICD-10-CM

## 2023-11-27 DIAGNOSIS — G62.9 NEUROPATHY: ICD-10-CM

## 2023-11-27 DIAGNOSIS — N18.6 ESRD (END STAGE RENAL DISEASE) ON DIALYSIS (MULTI): ICD-10-CM

## 2023-11-27 PROCEDURE — 99309 SBSQ NF CARE MODERATE MDM 30: CPT | Performed by: FAMILY MEDICINE

## 2023-11-27 NOTE — LETTER
Patient: Jamee Coronel  : 1945    Encounter Date: 2023    Nursing Home Visit  Name: Jamee Coronel  YOB: 1945  MRN: [id number]    Chief Complaint  Lab review  Subjective  HPI:   Ms. Coronel is a 78-year-old female with PMHx: T2DM, HTN, cirrhosis with Blake, esophageal varices, A-fib on Eliquis, ESRD, osteomyelitis of the back, anemia of chronic disease, thrombocytopenia.  Presented to the ED today after falling at home states she was trying to get into bed and she was too weak and fell on the ground tried to correct herself and her legs hit the chair denies any head injury or loss of consciousness.  Patient just completed antibiotic therapy for osteomyelitis of her back on her last dialysis day.  Patient states since she was diagnosed with the osteomyelitis she has been feeling weak and having difficulty ambulating and getting around. While in the ED her hemoglobin was found to be 7.9 she does have a history of chronic anemia.  Her platelets were 81 she also has a history of thrombocytopenia.  Mainly her x-rays were negative but she was found to have a right trimalleolar fracture Ortho saw her down in the emergency room took her to surgery the next morning.  She is on dialysis .  Hospital course uneventful and was discharged to SNF on 10/20  -----10/23   Patient is lying in bed she reports her legs hurt.  She points to her lateral thighs. Appetite fair to good.   -----10/30  Patient sitting wheelchair, is upset, she wants to return to bed, she does not like the way her dressing is wrapped on her ankle, she is having pain in her foot, she does not like the food.  She is overall irritable.  Appetite variable, mainly % meals consumed  Denies HA, dizziness, SOB, CP, N&V or constipation  ----  Dialysis today, she feels fatigued.  She denies pain or discomfort. Right ankle cast intact. Appetite fair.  Denies HA, dizziness, SOB, CP, N&V or constipation.   ----   Patient feels pretty good today. She states pain is controlled with tylenol.  Was seen by would care and sacral wound is resolved.  Appetite fair, family brings food in for her.  Denies HA, dizziness, SOB, CP, N&V or constipation  -----  Patient in bed is comfortable.  She denies pain or discomfort.  Appetite is variable, depending on what is served.  Family brings in food.  Denies HA, dizziness, SOB, CP, N&V or constipation  Labs drawn today.  ----  Patient returned from dialysis. She states her ortho appt was pushed back to December.  She reports nobody has looked at her right leg.  She denies pain or discomfort presently.  Appetite fair, family brings food for her most of the time.  Denies HA, dizziness, SOB, CP, N&V or constipation  --- Patient had dialysis today 2/2 to holiday this week. She feels fatigued and would like to go to bed.  She c/o bilateral knee pain. She states her right little toe feels better after trimming cast material.  Appetite is fair, but family brings food in to her  ---  Patient stated her brother has passed away and she [facility], she stated she wanted to go to her home to stay all night then to the  on Wednesday returning after the luncheon.  I explained to her staying at her home would not be an option 2/2 staying alone when she is not able ambulate to the bathroom.  She then stated she did not want to stay all night but she would be back to the facility late.  I agreed that was better.  She reports she had pain overnight right leg, but currently denies pain.  Denies HA, dizziness, SOB, CP, N&V or constipation.    Review of Systems:  Reviewed chart looking at current medications, treatment, labs and x-rays and note pertinent positives or negatives, otherwise 10 point system review negative except for what is noted in HPI.    Objective  VS: 139/63, 96.7, 67, 19, 97%, 174#    Physical exam:   Physical Exam  Vitals  and nursing note reviewed.   Constitutional:       Appearance: Normal appearance.   HENT:      Head: Normocephalic and atraumatic.      Mouth/Throat:      Mouth: Mucous membranes are moist.   Cardiovascular:      Rate and Rhythm: Normal rate and regular rhythm.      Pulses: Normal pulses.   Pulmonary:      Effort: Pulmonary effort is normal.   Abdominal:      General: Abdomen is flat.      Palpations: Abdomen is soft.   Musculoskeletal:      Comments: Mild edema LLE.  Right LE cast intact, toes warm without edema, able to move them w/o issue.   Skin:     General: Skin is warm and dry.   Neurological:      General: No focal deficit present.      Mental Status: She is oriented to person, place, and time. Mental status is at baseline.   Psychiatric:         Mood and Affect: Mood normal.         Behavior: Behavior normal. Behavior is cooperative.         Thought Content: Thought content normal.     Assessment/Plan   78-year-old female with PMHx: T2DM, HTN, cirrhosis with Blake, esophageal varices, A-fib on Eliquis, ESRD, osteomyelitis of the back, anemia of chronic disease, thrombocytopenia.  Presented to the ED today after falling at home states she was trying to get into bed and she was too weak and fell on the ground tried to correct herself and her legs hit the chair.     Trimalleolar fracture of ankle, closed, right, initial encounter  Cast stable and ace wrapped.  Monitor toes for any changes. F/u with surgeon 3 weeks after discharge from hospital, missed appt.   Rescheduled with Dr. Contreras  [phone] in December, per patient.  Transportation needs to be scheduled as well, to schedule appt.  Remains non weight bearing.  Patient claims nobody has looked at her right leg since her admission.  I did see it on admission, it was stable. Wound team follows up after.  Today removed ACE wrap.  She complained that the cast is rubbing against her little toe and is causing her pain.  Was able to cut back the cast a bit to  free up her toe.  There was an area that was reddened, but no open areas.  Cast is intact, no strike through, no odor.  Cast with ace.       Anemia of chronic disease/Pancytopenia  Pancytopenic-  WBC- 2.75 RBC- 2.33, HGB- 7.4, HCT- 23.6, PLTS- 68 today, 11/27.  Pancytopenia- trending up  very slowly.       Physical deconditioning   For PT/OT for mobility, gait training, endurance, safety awareness, ADLs, and assessment of post-rehab needs.     Type 2 diabetes mellitus with nephropathy (CMS/H[facility])  C/w NovoLOG Mix 70/30-- 10u/qam INSULIN ASPART PROT-ASP 70-30 8u/q afternoon.  BS stable low 100s     Neuropathy  C/w Gabapentin     ESRD (end stage renal disease) on dialysis (CMS/[facility])  -Dialysis at [facility] in [facility] Tuesday/Thursday/Saturday  -C/w Danevang-Isidro and midodrine 5 mg on dialysis days      Essential hypertension  Not on current medications at present BP is stable     Liver cirrhosis secondary to GEORGE (CMS/HCC)  Hx of GEORGE will order LFTS for baseline     Osteomyelitis of spine (CMS/[facility])  C/w lidocaine patch for pain and prn oxycodone    Patient states has f/u with ortho in December.  Transportation scheduled.  Discussed labs with her, monitor    Electronically Signed By: KIN Albrecht-CNP   11/28/23  5:53 PM

## 2023-11-28 NOTE — PROGRESS NOTES
Nursing Home Visit  Name: Jamee Coronel  YOB: 1945  MRN: [id number]    Chief Complaint  Lab review  Subjective  HPI:   Ms. Coronel is a 78-year-old female with PMHx: T2DM, HTN, cirrhosis with Blake, esophageal varices, A-fib on Eliquis, ESRD, osteomyelitis of the back, anemia of chronic disease, thrombocytopenia.  Presented to the ED today after falling at home states she was trying to get into bed and she was too weak and fell on the ground tried to correct herself and her legs hit the chair denies any head injury or loss of consciousness.  Patient just completed antibiotic therapy for osteomyelitis of her back on her last dialysis day.  Patient states since she was diagnosed with the osteomyelitis she has been feeling weak and having difficulty ambulating and getting around. While in the ED her hemoglobin was found to be 7.9 she does have a history of chronic anemia.  Her platelets were 81 she also has a history of thrombocytopenia.  Mainly her x-rays were negative but she was found to have a right trimalleolar fracture Ortho saw her down in the emergency room took her to surgery the next morning.  She is on dialysis T-TH-SA.  Hospital course uneventful and was discharged to SNF on 10/20  -----10/23   Patient is lying in bed she reports her legs hurt.  She points to her lateral thighs. Appetite fair to good.   -----10/30  Patient sitting wheelchair, is upset, she wants to return to bed, she does not like the way her dressing is wrapped on her ankle, she is having pain in her foot, she does not like the food.  She is overall irritable.  Appetite variable, mainly % meals consumed  Denies HA, dizziness, SOB, CP, N&V or constipation  ----11/2  Dialysis today, she feels fatigued.  She denies pain or discomfort. Right ankle cast intact. Appetite fair.  Denies HA, dizziness, SOB, CP, N&V or constipation.   ----11/6  Patient feels pretty good today. She states pain is controlled with tylenol.  Was  seen by would care and sacral wound is resolved.  Appetite fair, family brings food in for her.  Denies HA, dizziness, SOB, CP, N&V or constipation  -----  Patient in bed is comfortable.  She denies pain or discomfort.  Appetite is variable, depending on what is served.  Family brings in food.  Denies HA, dizziness, SOB, CP, N&V or constipation  Labs drawn today.  ----  Patient returned from dialysis. She states her ortho appt was pushed back to December.  She reports nobody has looked at her right leg.  She denies pain or discomfort presently.  Appetite fair, family brings food for her most of the time.  Denies HA, dizziness, SOB, CP, N&V or constipation  --- Patient had dialysis today 2/2 to holiday this week. She feels fatigued and would like to go to bed.  She c/o bilateral knee pain. She states her right little toe feels better after trimming cast material.  Appetite is fair, but family brings food in to her  ---  Patient stated her brother has passed away and she [facility], she stated she wanted to go to her home to stay all night then to the  on Wednesday returning after the luncheon.  I explained to her staying at her home would not be an option 2/2 staying alone when she is not able ambulate to the bathroom.  She then stated she did not want to stay all night but she would be back to the facility late.  I agreed that was better.  She reports she had pain overnight right leg, but currently denies pain.  Denies HA, dizziness, SOB, CP, N&V or constipation.    Review of Systems:  Reviewed chart looking at current medications, treatment, labs and x-rays and note pertinent positives or negatives, otherwise 10 point system review negative except for what is noted in HPI.    Objective  VS: 139/63, 96.7, 67, 19, 97%, 174#    Physical exam:   Physical Exam  Vitals and nursing note reviewed.   Constitutional:       Appearance: Normal appearance.    HENT:      Head: Normocephalic and atraumatic.      Mouth/Throat:      Mouth: Mucous membranes are moist.   Cardiovascular:      Rate and Rhythm: Normal rate and regular rhythm.      Pulses: Normal pulses.   Pulmonary:      Effort: Pulmonary effort is normal.   Abdominal:      General: Abdomen is flat.      Palpations: Abdomen is soft.   Musculoskeletal:      Comments: Mild edema LLE.  Right LE cast intact, toes warm without edema, able to move them w/o issue.   Skin:     General: Skin is warm and dry.   Neurological:      General: No focal deficit present.      Mental Status: She is oriented to person, place, and time. Mental status is at baseline.   Psychiatric:         Mood and Affect: Mood normal.         Behavior: Behavior normal. Behavior is cooperative.         Thought Content: Thought content normal.     Assessment/Plan   78-year-old female with PMHx: T2DM, HTN, cirrhosis with Blake, esophageal varices, A-fib on Eliquis, ESRD, osteomyelitis of the back, anemia of chronic disease, thrombocytopenia.  Presented to the ED today after falling at home states she was trying to get into bed and she was too weak and fell on the ground tried to correct herself and her legs hit the chair.     Trimalleolar fracture of ankle, closed, right, initial encounter  Cast stable and ace wrapped.  Monitor toes for any changes. F/u with surgeon 3 weeks after discharge from hospital, missed appt.   Rescheduled with Dr. Contreras  [phone] in December, per patient.  Transportation needs to be scheduled as well, to schedule appt.  Remains non weight bearing.  Patient claims nobody has looked at her right leg since her admission.  I did see it on admission, it was stable. Wound team follows up after.  Today removed ACE wrap.  She complained that the cast is rubbing against her little toe and is causing her pain.  Was able to cut back the cast a bit to free up her toe.  There was an area that was reddened, but no open areas.  Cast is  intact, no strike through, no odor.  Cast with ace.       Anemia of chronic disease/Pancytopenia  Pancytopenic-  WBC- 2.75 RBC- 2.33, HGB- 7.4, HCT- 23.6, PLTS- 68 today, 11/27.  Pancytopenia- trending up  very slowly.       Physical deconditioning   For PT/OT for mobility, gait training, endurance, safety awareness, ADLs, and assessment of post-rehab needs.     Type 2 diabetes mellitus with nephropathy (CMS/[facility])  C/w NovoLOG Mix 70/30-- 10u/qam INSULIN ASPART PROT-ASP 70-30 8u/q afternoon.  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  Transportation scheduled.  Discussed labs with her, monitor

## 2023-12-06 ENCOUNTER — HOSPITAL ENCOUNTER (OUTPATIENT)
Dept: RADIOLOGY | Facility: HOSPITAL | Age: 78
Discharge: HOME | End: 2023-12-06
Payer: MEDICARE

## 2023-12-06 ENCOUNTER — OFFICE VISIT (OUTPATIENT)
Dept: ORTHOPEDIC SURGERY | Facility: HOSPITAL | Age: 78
End: 2023-12-06
Payer: MEDICARE

## 2023-12-06 DIAGNOSIS — S91.309A OPEN WOUND OF ANKLE AND FOOT: ICD-10-CM

## 2023-12-06 DIAGNOSIS — S82.851A TRIMALLEOLAR FRACTURE OF ANKLE, CLOSED, RIGHT, INITIAL ENCOUNTER: ICD-10-CM

## 2023-12-06 DIAGNOSIS — S91.009A OPEN WOUND OF ANKLE AND FOOT: ICD-10-CM

## 2023-12-06 PROCEDURE — 73590 X-RAY EXAM OF LOWER LEG: CPT | Mod: RT

## 2023-12-06 PROCEDURE — 1125F AMNT PAIN NOTED PAIN PRSNT: CPT | Performed by: ORTHOPAEDIC SURGERY

## 2023-12-06 PROCEDURE — 73610 X-RAY EXAM OF ANKLE: CPT | Mod: RIGHT SIDE | Performed by: RADIOLOGY

## 2023-12-06 PROCEDURE — 99024 POSTOP FOLLOW-UP VISIT: CPT | Performed by: ORTHOPAEDIC SURGERY

## 2023-12-06 PROCEDURE — 73610 X-RAY EXAM OF ANKLE: CPT | Mod: RT,FY

## 2023-12-06 PROCEDURE — 73590 X-RAY EXAM OF LOWER LEG: CPT | Mod: RT,FY

## 2023-12-06 PROCEDURE — 73610 X-RAY EXAM OF ANKLE: CPT | Mod: RT

## 2023-12-06 PROCEDURE — 73590 X-RAY EXAM OF LOWER LEG: CPT | Mod: RIGHT SIDE | Performed by: RADIOLOGY

## 2023-12-06 PROCEDURE — 1036F TOBACCO NON-USER: CPT | Performed by: ORTHOPAEDIC SURGERY

## 2023-12-06 PROCEDURE — 1160F RVW MEDS BY RX/DR IN RCRD: CPT | Performed by: ORTHOPAEDIC SURGERY

## 2023-12-06 PROCEDURE — 1159F MED LIST DOCD IN RCRD: CPT | Performed by: ORTHOPAEDIC SURGERY

## 2023-12-06 NOTE — PROGRESS NOTES
Jamee Coronel is  post-op from right hindfoot fusion nail for trimalleolar ankle fracture on 10/16/2023. she is doing well at this point.  Pain is well controlled  Denies fevers or chills.  Denies drainage from the wound.  she reports no additional symptoms or concerns. No shortness of breath or chest pain. No calf swelling or pain.    The patients full medical history, surgical history, medications, allergies, family, medical history, social history, and a complete 14 point review of systems is documented in the medical record on the signed, scanned medical intake sheet or reviewed in the history of present illness. Review of systems otherwise negative    Past Medical History:   Diagnosis Date    A-fib (CMS/HCC)     Diabetes mellitus (CMS/HCC)     Esophageal varices (CMS/HCC)     ESRD (end stage renal disease) (CMS/HCC)     Fatigue 09/21/2021    Hypertension     Impaired function of upper extremity 02/07/2020    Jaundice 09/19/2018    Liver cirrhosis secondary to GEORGE (CMS/HCC)     Lower urinary tract infectious disease 10/06/2014    Metabolic acidosis 04/20/2015    Osteomyelitis (CMS/HCC)     Osteoporosis     Platelets decreased (CMS/HCC) 09/22/2023       Medication Documentation Review Audit       Reviewed by Adamaris Mcneill, PharmD (Pharmacist) on 10/19/23 at 1026      Medication Order Taking? Sig Documenting Provider Last Dose Status   acetaminophen (Tylenol) 500 mg tablet [id number]  Take 1 tablet (500 mg) by mouth every 8 hours if needed for mild pain (1 - 3). Historical Provider, MD  Active   apixaban (Eliquis) 2.5 mg tablet [id number] Yes Take 1 tablet (2.5 mg) by mouth twice a day. Historical Provider, MD  Active   B complex-vitamin C-folic acid (Robert Caps) 1 mg capsule [id number] Yes Take 1 capsule by mouth once daily. Historical Provider, MD  Active   calcium carbonate (Tums) 250 mg ([place] 100 mg) chewable split tablet [id number]  Take 2 half tablet (500 mg) by mouth 3 times a day as needed. Historical  Provider, MD  Active   gabapentin (Neurontin) 100 mg capsule [id number] Yes Take 2 capsules (200 mg) by mouth once daily at bedtime. Historical Provider, MD  Active   gabapentin (Neurontin) 100 mg capsule [id number]  Take 1 capsule (100 mg) by mouth once daily in the morning. Historical Provider, MD  Active   insulin asp prt-insulin aspart (NovoLOG Mix 70-30 U-100 Insuln) 100 unit/mL (70-30) injection [id number]  Inject 10 Units under the skin once daily in the morning. Take as directed per insulin instructions. Historical Provider, MD  Active   insulin asp prt-insulin aspart (NovoLOG Mix 70-30) 100 unit/mL (70-30) injection [id number]  Inject 8 Units under the skin once daily in the evening. Take as directed per insulin instructions. Historical Provider, MD  Active   lidocaine (Lidoderm) 5 % patch [id number] Yes Place 1 patch on the skin once daily. Historical Provider, MD  Active   meclizine (Antivert) 25 mg tablet [id number] Yes Take 0.5 tablets (12.5 mg) by mouth 3 times a day as needed for dizziness. Historical Provider, MD  Active   methocarbamol (Robaxin) 500 mg tablet [id number] Yes Take 1 tablet (500 mg) by mouth every 8 hours if needed. Historical Provider, MD  Active   midodrine (Proamatine) 5 mg tablet [id number] Yes Take 1 tablet (5 mg) by mouth 3 (three) times a week. Tu/Th/Sa Historical Provider, MD  Active   oxyCODONE-acetaminophen (Percocet) 5-325 mg tablet [id number]  Take 0.5-1 tablets by mouth every 12 hours if needed for severe pain (7 - 10). Historical Provider, MD  Active                    Allergies   Allergen Reactions    Darbepoetin Tong-Albumin Unknown     Abdominal pain and headache    Povidone-Iodine Itching     Pt. States redness and itchiness to area       Social History     Socioeconomic History    Marital status: Single     Spouse name: Not on file    Number of children: Not on file    Years of education: Not on file    Highest education level: Not on file   Occupational History    Not on  file   Tobacco Use    Smoking status: Former     Packs/day: 2.00     Years: 15.00     Additional pack years: 0.00     Total pack years: 30.00     Types: Cigarettes    Smokeless tobacco: Never   Substance and Sexual Activity    Alcohol use: Never    Drug use: Never    Sexual activity: Defer   Other Topics Concern    Not on file   Social History Narrative    Not on file     Social Determinants of Health     Financial Resource Strain: Low Risk  (10/15/2023)    Overall Financial Resource Strain (CARDIA)     Difficulty of Paying Living Expenses: Not very hard   Food Insecurity: Not on file   Transportation Needs: No Transportation Needs (10/15/2023)    PRAPARE - Transportation     Lack of Transportation (Medical): No     Lack of Transportation (Non-Medical): No   Physical Activity: Not on file   Stress: Not on file   Social Connections: Not on file   Intimate Partner Violence: Not on file   Housing Stability: Low Risk  (10/15/2023)    Housing Stability Vital Sign     Unable to Pay for Housing in the Last Year: No     Number of Places Lived in the Last Year: 1     Unstable Housing in the Last Year: No       Past Surgical History:   Procedure Laterality Date    ABDOMINAL SURGERY      APPENDECTOMY      AV FISTULA PLACEMENT      BREAST SURGERY      CARPAL TUNNEL RELEASE      CHOLECYSTECTOMY      EYE SURGERY      MASTOID SURGERY      WRIST SURGERY         Gen: The patient is alert and oriented ×3, is in no acute distress, and appear their stated age and weight.    Psychiatric: Mood and affect are appropriate.    Eyes: Sclera are white, and pupils are round and symmetric.    ENT: Mucous membranes are moist.     Neck: Supple. Thyroid is midline.    Respiratory: Respirations are nonlabored, chest rise is symmetric.    Cardiac: Rate is regular by palpation of distal pulses.     Abdomen: Nondistended.    Integument: No obvious cutaneous lesions are noted. No signs of lymphangitis. No signs of systemic edema.  side: right lower  extremity :  her  surgical incisions are healing well, without evidence of erythema, fluctuance, drainage, or infection.  The skin around the incision is intact.  Distally neurovascular exam is stable.  There is appropriate tenderness to palpation in the juan jose-incisional area. No calf swelling or tenderness to palpation.  There is a pressure ulcer over the posterior heel.      I personally reviewed multiple views of right ankle and foot were obtained in the office today demonstrate maintenance of reduction, interval healing, and a stable position of the hardware.      Jamee Coronel is a 78 y.o. female patient status post right hindfoot fusion nail for trimalleolar ankle fracture on 10/16/2023   I went over her x-rays in detail today.  Stitches removed in office today she is WBAT of the side: right lower extremity. ~He/she~ is range of motion as tolerated of the side: right lower extremity.  She will have no ankle or subtalar range of motion.  She needs to go to wound care as she has been in the splint this entire time and has been resting her heel on the ground and this is created a pressure ulcer.  At this point she is a very complex situation and that she got kicked out of her nursing facility for going to a .  It sounds like she is unable to take care of herself at home.  She might have to go to the hospital to be admitted for placement.  I really encouraged her to work on offloading of her posterior heel and going to wound care and these will be vital to keeping her leg.  She is at risk for amputation due to the ulcer on the back of her heel.  She has diabetes and is a dialysis patient.  I stressed the importance of physical therapy on overall functional outcome. I answered all patient's questions he agrees with treatment plan.  I will see her back in Follow-up 4 week(s)with repeat 3 views of the right ankle.        Rogelio Contreras    Department of Orthopaedic Trauma Surgery

## 2024-01-03 ENCOUNTER — HOSPITAL ENCOUNTER (OUTPATIENT)
Dept: RADIOLOGY | Facility: HOSPITAL | Age: 79
Discharge: HOME | End: 2024-01-03
Payer: MEDICARE

## 2024-01-03 ENCOUNTER — OFFICE VISIT (OUTPATIENT)
Dept: ORTHOPEDIC SURGERY | Facility: HOSPITAL | Age: 79
End: 2024-01-03
Payer: MEDICARE

## 2024-01-03 VITALS — BODY MASS INDEX: 26.63 KG/M2 | WEIGHT: 165 LBS

## 2024-01-03 DIAGNOSIS — S82.851D CLOSED DISPLACED TRIMALLEOLAR FRACTURE OF RIGHT ANKLE WITH ROUTINE HEALING, SUBSEQUENT ENCOUNTER: Primary | ICD-10-CM

## 2024-01-03 DIAGNOSIS — S82.851A TRIMALLEOLAR FRACTURE OF ANKLE, CLOSED, RIGHT, INITIAL ENCOUNTER: ICD-10-CM

## 2024-01-03 PROCEDURE — 73610 X-RAY EXAM OF ANKLE: CPT | Mod: RT

## 2024-01-03 PROCEDURE — 1036F TOBACCO NON-USER: CPT | Performed by: ORTHOPAEDIC SURGERY

## 2024-01-03 PROCEDURE — 1159F MED LIST DOCD IN RCRD: CPT | Performed by: ORTHOPAEDIC SURGERY

## 2024-01-03 PROCEDURE — 73630 X-RAY EXAM OF FOOT: CPT | Mod: RT

## 2024-01-03 PROCEDURE — 99024 POSTOP FOLLOW-UP VISIT: CPT | Performed by: ORTHOPAEDIC SURGERY

## 2024-01-03 PROCEDURE — 1125F AMNT PAIN NOTED PAIN PRSNT: CPT | Performed by: ORTHOPAEDIC SURGERY

## 2024-01-03 PROCEDURE — 1160F RVW MEDS BY RX/DR IN RCRD: CPT | Performed by: ORTHOPAEDIC SURGERY

## 2024-01-03 PROCEDURE — 73610 X-RAY EXAM OF ANKLE: CPT | Mod: RIGHT SIDE | Performed by: RADIOLOGY

## 2024-01-03 PROCEDURE — 73630 X-RAY EXAM OF FOOT: CPT | Mod: RIGHT SIDE | Performed by: RADIOLOGY

## 2024-01-03 ASSESSMENT — PAIN - FUNCTIONAL ASSESSMENT: PAIN_FUNCTIONAL_ASSESSMENT: 0-10

## 2024-01-03 ASSESSMENT — PAIN DESCRIPTION - DESCRIPTORS: DESCRIPTORS: ACHING

## 2024-01-03 ASSESSMENT — PAIN SCALES - GENERAL: PAINLEVEL_OUTOF10: 7

## 2024-01-03 NOTE — PROGRESS NOTES
Jamee Coronel is  post-op from right hindfoot fusion nail for trimalleolar ankle fracture on 10/16/2023. she is doing well at this point.  Pain is well controlled  Denies fevers or chills.  Denies drainage from the wound.  she reports no additional symptoms or concerns. No shortness of breath or chest pain. No calf swelling or pain.    The patients full medical history, surgical history, medications, allergies, family, medical history, social history, and a complete 14 point review of systems is documented in the medical record on the signed, scanned medical intake sheet or reviewed in the history of present illness. Review of systems otherwise negative    Past Medical History:   Diagnosis Date    A-fib (CMS/HCC)     Diabetes mellitus (CMS/HCC)     Esophageal varices (CMS/HCC)     ESRD (end stage renal disease) (CMS/HCC)     Fatigue 2021    Hypertension     Impaired function of upper extremity 2020    Jaundice 2018    Liver cirrhosis secondary to GEORGE (CMS/HCC)     Lower urinary tract infectious disease 10/06/2014    Metabolic acidosis 2015    Osteomyelitis (CMS/HCC)     Osteoporosis     Platelets decreased (CMS/HCC) 2023       Medication Documentation Review Audit       Reviewed by Rogelio Contreras MD (Physician) on 23 at 1256      Medication Order Taking? Sig Documenting Provider Last Dose Status   apixaban (Eliquis) 5 mg tablet [id number]  Take 1 tablet (5 mg) by mouth 2 times a day. Dean Chavarria MD   23 2359   calcium carbonate (Tums) 250 mg ([place] 100 mg) chewable split tablet [id number]  Take 2 half tablet (500 mg) by mouth 3 times a day as needed. Historical Provider, MD  Active   gabapentin (Neurontin) 100 mg capsule [id number]  Take 2 capsules (200 mg) by mouth once daily at bedtime. Historical Provider, MD  Active   insulin asp prt-insulin aspart (NovoLOG Mix 70-30 U-100 Insuln) 100 unit/mL (70-30) injection [id number]  Inject 6 Units under the skin  once daily in the morning. Take as directed per insulin instructions. Oh Gupta MD  Active   insulin asp prt-insulin aspart (NovoLOG Mix 70-30) 100 unit/mL (70-30) injection [id number]  Inject 6 Units under the skin once daily in the evening. Take as directed per insulin instructions. Oh Gupta MD  Active   lidocaine (Lidoderm) 5 % patch [id number]  Place 1 patch on the skin once daily. Historical Provider, MD  Active   meclizine (Antivert) 25 mg tablet [id number]  Take 0.5 tablets (12.5 mg) by mouth 3 times a day as needed for dizziness. Historical Provider, MD  Active   midodrine (Proamatine) 5 mg tablet [id number]  Take 1 tablet (5 mg) by mouth 3 (three) times a week. Tu/Th/Sa Historical Provider, MD  Active                    Allergies   Allergen Reactions    Darbepoetin Tong-Albumin Unknown     Abdominal pain and headache    Povidone-Iodine Itching     Pt. States redness and itchiness to area       Social History     Socioeconomic History    Marital status: Single     Spouse name: Not on file    Number of children: Not on file    Years of education: Not on file    Highest education level: Not on file   Occupational History    Not on file   Tobacco Use    Smoking status: Former     Packs/day: 2.00     Years: 15.00     Additional pack years: 0.00     Total pack years: 30.00     Types: Cigarettes    Smokeless tobacco: Never   Substance and Sexual Activity    Alcohol use: Never    Drug use: Never    Sexual activity: Defer   Other Topics Concern    Not on file   Social History Narrative    Not on file     Social Determinants of Health     Financial Resource Strain: Low Risk  (10/15/2023)    Overall Financial Resource Strain (CARDIA)     Difficulty of Paying Living Expenses: Not very hard   Food Insecurity: Not on file   Transportation Needs: No Transportation Needs (10/15/2023)    PRAPARE - Transportation     Lack of Transportation (Medical): No     Lack of Transportation (Non-Medical): No   Physical  Activity: Not on file   Stress: Not on file   Social Connections: Not on file   Intimate Partner Violence: Not on file   Housing Stability: Low Risk  (10/15/2023)    Housing Stability Vital Sign     Unable to Pay for Housing in the Last Year: No     Number of Places Lived in the Last Year: 1     Unstable Housing in the Last Year: No       Past Surgical History:   Procedure Laterality Date    ABDOMINAL SURGERY      APPENDECTOMY      AV FISTULA PLACEMENT      BREAST SURGERY      CARPAL TUNNEL RELEASE      CHOLECYSTECTOMY      EYE SURGERY      MASTOID SURGERY      WRIST SURGERY         Gen: The patient is alert and oriented ×3, is in no acute distress, and appear their stated age and weight.    Psychiatric: Mood and affect are appropriate.    Eyes: Sclera are white, and pupils are round and symmetric.    ENT: Mucous membranes are moist.     Neck: Supple. Thyroid is midline.    Respiratory: Respirations are nonlabored, chest rise is symmetric.    Cardiac: Rate is regular by palpation of distal pulses.     Abdomen: Nondistended.    Integument: No obvious cutaneous lesions are noted. No signs of lymphangitis. No signs of systemic edema.  side: right lower extremity :  her  surgical incisions are healing well, without evidence of erythema, fluctuance, drainage, or infection.  The skin around the incision is intact.  Distally neurovascular exam is stable.  There is appropriate tenderness to palpation in the juan jose-incisional area. No calf swelling or tenderness to palpation.  There is a pressure ulcer over the posterior heel which is an eschar at this point and looks more stable.      I personally reviewed multiple views of right ankle and foot were obtained in the office today demonstrate maintenance of reduction, interval healing, and a stable position of the hardware.      Jamee Coronel is a 78 y.o. female patient status post right hindfoot fusion nail for trimalleolar ankle fracture on 10/16/2023   I went over her  x-rays in detail today.   she is WBAT of the side: right lower extremity. ~He/she~ is range of motion as tolerated of the side: right lower extremity.  She will have no ankle or subtalar range of motion.  She needs to go to wound care as she has been in the splint this entire time and has been resting her heel on the ground and this is created a pressure ulcer.  She is at a facility at this point.  It seems that there is an wound care nurse.  If she does leave the facility she does need to go to wound care for follow-up in addition to seeing me.  Her wound is looking much better but still has not completely healed.   I really encouraged her to work on offloading of her posterior heel and going to wound care and these will be vital to keeping her leg.  She is at risk for amputation due to the ulcer on the back of her heel.  She has diabetes and is a dialysis patient.  I stressed the importance of physical therapy on overall functional outcome. I answered all patient's questions he agrees with treatment plan.  I will see her back in Follow-up 2 months with repeat 3 views of the right ankle and 3 views of the right foot        Rogelio Contreras    Department of Orthopaedic Trauma Surgery

## 2024-02-07 ENCOUNTER — APPOINTMENT (OUTPATIENT)
Dept: ORTHOPEDIC SURGERY | Facility: HOSPITAL | Age: 79
End: 2024-02-07
Payer: COMMERCIAL

## 2024-03-06 ENCOUNTER — APPOINTMENT (OUTPATIENT)
Dept: ORTHOPEDIC SURGERY | Facility: HOSPITAL | Age: 79
End: 2024-03-06
Payer: COMMERCIAL

## 2024-03-13 ENCOUNTER — OFFICE VISIT (OUTPATIENT)
Dept: ORTHOPEDIC SURGERY | Facility: CLINIC | Age: 79
End: 2024-03-13
Payer: MEDICARE

## 2024-03-13 ENCOUNTER — HOSPITAL ENCOUNTER (OUTPATIENT)
Dept: RADIOLOGY | Facility: CLINIC | Age: 79
Discharge: HOME | End: 2024-03-13
Payer: MEDICARE

## 2024-03-13 DIAGNOSIS — S82.851D CLOSED DISPLACED TRIMALLEOLAR FRACTURE OF RIGHT ANKLE WITH ROUTINE HEALING, SUBSEQUENT ENCOUNTER: ICD-10-CM

## 2024-03-13 DIAGNOSIS — M54.50 LOW BACK PAIN, UNSPECIFIED BACK PAIN LATERALITY, UNSPECIFIED CHRONICITY, UNSPECIFIED WHETHER SCIATICA PRESENT: ICD-10-CM

## 2024-03-13 PROCEDURE — 1125F AMNT PAIN NOTED PAIN PRSNT: CPT | Performed by: ORTHOPAEDIC SURGERY

## 2024-03-13 PROCEDURE — 73630 X-RAY EXAM OF FOOT: CPT | Mod: RIGHT SIDE | Performed by: RADIOLOGY

## 2024-03-13 PROCEDURE — 99213 OFFICE O/P EST LOW 20 MIN: CPT | Performed by: ORTHOPAEDIC SURGERY

## 2024-03-13 PROCEDURE — 73610 X-RAY EXAM OF ANKLE: CPT | Mod: RIGHT SIDE | Performed by: RADIOLOGY

## 2024-03-13 PROCEDURE — 1159F MED LIST DOCD IN RCRD: CPT | Performed by: ORTHOPAEDIC SURGERY

## 2024-03-13 PROCEDURE — 73630 X-RAY EXAM OF FOOT: CPT | Mod: RT

## 2024-03-13 PROCEDURE — 1160F RVW MEDS BY RX/DR IN RCRD: CPT | Performed by: ORTHOPAEDIC SURGERY

## 2024-03-13 PROCEDURE — 1036F TOBACCO NON-USER: CPT | Performed by: ORTHOPAEDIC SURGERY

## 2024-03-13 PROCEDURE — 73610 X-RAY EXAM OF ANKLE: CPT | Mod: RT

## 2024-03-13 NOTE — PROGRESS NOTES
Jamee Coronel is  post-op from right hindfoot fusion nail for trimalleolar ankle fracture on 10/16/2023. she is doing well at this point.  Pain is well controlled  Denies fevers or chills.  Denies drainage from the wound.  she reports no additional symptoms or concerns. No shortness of breath or chest pain. No calf swelling or pain.  She is having some buttock and greater trochanter pain.  She is not going to wound care.    The patients full medical history, surgical history, medications, allergies, family, medical history, social history, and a complete 14 point review of systems is documented in the medical record on the signed, scanned medical intake sheet or reviewed in the history of present illness. Review of systems otherwise negative    Past Medical History:   Diagnosis Date    A-fib (CMS/HCC)     Diabetes mellitus (CMS/HCC)     Esophageal varices (CMS/HCC)     ESRD (end stage renal disease) (CMS/HCC)     Fatigue 2021    Hypertension     Impaired function of upper extremity 2020    Jaundice 2018    Liver cirrhosis secondary to GEORGE (CMS/HCC)     Lower urinary tract infectious disease 10/06/2014    Metabolic acidosis 2015    Osteomyelitis (CMS/HCC)     Osteoporosis     Platelets decreased (CMS/HCC) 2023       Medication Documentation Review Audit       Reviewed by Pastora Rendon MA (Medical Assistant) on 24 at 1359      Medication Order Taking? Sig Documenting Provider Last Dose Status   apixaban (Eliquis) 5 mg tablet [id number]  Take 1 tablet (5 mg) by mouth 2 times a day. Dean Chavarria MD   23 8287   calcium carbonate (Tums) 250 mg ([place] 100 mg) chewable split tablet [id number] Yes Take 2 half tablet (500 mg) by mouth 3 times a day as needed. Historical Provider, MD Taking Active   gabapentin (Neurontin) 100 mg capsule [id number] Yes Take 2 capsules (200 mg) by mouth once daily at bedtime. Historical Provider, MD Taking Active   insulin asp  prt-insulin aspart (NovoLOG Mix 70-30 U-100 Insuln) 100 unit/mL (70-30) injection [id number] Yes Inject 6 Units under the skin once daily in the morning. Take as directed per insulin instructions. Oh Gupta MD Taking Active   insulin asp prt-insulin aspart (NovoLOG Mix 70-30) 100 unit/mL (70-30) injection [id number] Yes Inject 6 Units under the skin once daily in the evening. Take as directed per insulin instructions. Oh Gupta MD Taking Active   lidocaine (Lidoderm) 5 % patch [id number] Yes Place 1 patch on the skin once daily. Historical Provider, MD Taking Active   meclizine (Antivert) 25 mg tablet [id number] Yes Take 0.5 tablets (12.5 mg) by mouth 3 times a day as needed for dizziness. Historical Provider, MD Taking Active   midodrine (Proamatine) 5 mg tablet [id number] Yes Take 1 tablet (5 mg) by mouth 3 (three) times a week. Tu/Th/Sa Historical Provider, MD Taking Active                    Allergies   Allergen Reactions    Darbepoetin Tong-Albumin Unknown     Abdominal pain and headache    Povidone-Iodine Itching     Pt. States redness and itchiness to area       Social History     Socioeconomic History    Marital status: Single     Spouse name: Not on file    Number of children: Not on file    Years of education: Not on file    Highest education level: Not on file   Occupational History    Not on file   Tobacco Use    Smoking status: Former     Packs/day: 2.00     Years: 15.00     Additional pack years: 0.00     Total pack years: 30.00     Types: Cigarettes    Smokeless tobacco: Never   Substance and Sexual Activity    Alcohol use: Never    Drug use: Never    Sexual activity: Defer   Other Topics Concern    Not on file   Social History Narrative    Not on file     Social Determinants of Health     Financial Resource Strain: Low Risk  (10/15/2023)    Overall Financial Resource Strain (CARDIA)     Difficulty of Paying Living Expenses: Not very hard   Food Insecurity: Not on file   Transportation  Needs: No Transportation Needs (10/15/2023)    PRAPARE - Transportation     Lack of Transportation (Medical): No     Lack of Transportation (Non-Medical): No   Physical Activity: Not on file   Stress: Not on file   Social Connections: Not on file   Intimate Partner Violence: Not on file   Housing Stability: Low Risk  (10/15/2023)    Housing Stability Vital Sign     Unable to Pay for Housing in the Last Year: No     Number of Places Lived in the Last Year: 1     Unstable Housing in the Last Year: No       Past Surgical History:   Procedure Laterality Date    ABDOMINAL SURGERY      APPENDECTOMY      AV FISTULA PLACEMENT      BREAST SURGERY      CARPAL TUNNEL RELEASE      CHOLECYSTECTOMY      EYE SURGERY      MASTOID SURGERY      WRIST SURGERY         Gen: The patient is alert and oriented ×3, is in no acute distress, and appear their stated age and weight.    Psychiatric: Mood and affect are appropriate.    Eyes: Sclera are white, and pupils are round and symmetric.    ENT: Mucous membranes are moist.     Neck: Supple. Thyroid is midline.    Respiratory: Respirations are nonlabored, chest rise is symmetric.    Cardiac: Rate is regular by palpation of distal pulses.     Abdomen: Nondistended.    Integument: No obvious cutaneous lesions are noted. No signs of lymphangitis. No signs of systemic edema.  side: right lower extremity :  her  surgical incisions are healing well, without evidence of erythema, fluctuance, drainage, or infection.  The skin around the incision is intact.  Distally neurovascular exam is stable.  There is appropriate tenderness to palpation in the juan jose-incisional area. No calf swelling or tenderness to palpation.  There is a pressure ulcer over the posterior heel which is an eschar at this point and looks more stable.      I personally reviewed multiple views of right ankle and foot were obtained in the office today demonstrate maintenance of reduction, interval healing, and a stable position of  the hardware.      Jamee Coronel is a 78 y.o. female patient status post right hindfoot fusion nail for trimalleolar ankle fracture on 10/16/2023   I went over her x-rays in detail today.   she is WBAT of the side: right lower extremity. ~He/she~ is range of motion as tolerated of the side: right lower extremity.  She will have no ankle or subtalar range of motion.  She stopped going to wound care she has an eschar on the back of her heel which is continue to make progress and not draining.  She is still at risk due to her medical comorbidities.  I really stressed the importance of offloading the heel.  And making sure that no pressure on it.  For her back we will refer her to spine for back pain radiating down her leg radiculopathy.  This is out of my practice..  She is at risk for amputation due to the ulcer on the back of her heel.  She has diabetes and is a dialysis patient.  I stressed the importance of physical therapy on overall functional outcome. I answered all patient's questions he agrees with treatment plan.  I will see her back in Follow-up 6 months with repeat 3 views of the right ankle and 3 views of the right foot        Rogelio Contreras    Department of Orthopaedic Trauma Surgery

## 2024-06-17 ENCOUNTER — TELEPHONE (OUTPATIENT)
Dept: ORTHOPEDIC SURGERY | Facility: CLINIC | Age: 79
End: 2024-06-17
Payer: COMMERCIAL

## 2024-06-17 NOTE — TELEPHONE ENCOUNTER
Copied from CRM #[id number]. Topic: Information Request - Get Appointment Information  >> Jun 17, 2024 10:20 AM Luzmaria RAZO wrote:  Patient's sister Shannon called in looking to see if the forms she sent in were filled out. She can be best reached at

## 2024-09-25 ENCOUNTER — APPOINTMENT (OUTPATIENT)
Dept: ORTHOPEDIC SURGERY | Facility: CLINIC | Age: 79
End: 2024-09-25
Payer: COMMERCIAL

## (undated) DEVICE — COVER, CART, 45 X 27 X 48 IN, CLEAR

## (undated) DEVICE — BANDAGE, ELASTIC, MATRIX, SELF-CLOSURE, 6 IN X 5 YD, LF

## (undated) DEVICE — BANDAGE, GAUZE, CONFORMING, KERLIX, 6 PLY, 4.5 IN X 4.1 YD

## (undated) DEVICE — STAPLER, SKIN PROXIMATE, 35 WIDE

## (undated) DEVICE — SUTURE, ETHILON, 2-0, FSLX 30, BLACK

## (undated) DEVICE — SUTURE, MONOCRYL, 2-0, 27 IN, SH/V-20 , UNDYED

## (undated) DEVICE — TIP, SUCTION, FRAZIER, W/CONTROL VENT, 12 FR

## (undated) DEVICE — MANIFOLD, 4 PORT NEPTUNE STANDARD

## (undated) DEVICE — PADDING, WEBRIL, UNDERCAST, STERILE, 4 IN

## (undated) DEVICE — DRAPE, SHEET, THREE QUARTER, FAN FOLD, 57 X 77 IN

## (undated) DEVICE — ELECTRODE, ELECTROSURGICAL, BLADE, INSULATED, ENT/IMA, STERILE

## (undated) DEVICE — DRAPE, SHEET, U, W/ADHESIVE STRIP, IMPERVIOUS, 60 X 70 IN, DISPOSABLE, LF, STERILE

## (undated) DEVICE — Device

## (undated) DEVICE — DRAPE COVER, C ARM, FLOUROSCAN IMAGING SYS

## (undated) DEVICE — SUTURE, PDS II, 0, 27 IN, CT-2, VIOLET

## (undated) DEVICE — TOWEL, SURGICAL, NEURO, O/R, 16 X 26, BLUE, STERILE

## (undated) DEVICE — COVER, C-ARM W/CLIPS, OEC GE

## (undated) DEVICE — BANDAGE, ESMARK, 6 IN X 9 FT, STERILE

## (undated) DEVICE — TRAY, MINOR, SINGLE BASIN, STERILE

## (undated) DEVICE — IRRIGATION SET, Y, LARGE BORE

## (undated) DEVICE — PAD, GROUNDING, ELECTROSURGICAL, W/9 FT CABLE, POLYHESIVE II, ADULT, LF